# Patient Record
Sex: FEMALE | Race: WHITE | NOT HISPANIC OR LATINO | Employment: OTHER | URBAN - METROPOLITAN AREA
[De-identification: names, ages, dates, MRNs, and addresses within clinical notes are randomized per-mention and may not be internally consistent; named-entity substitution may affect disease eponyms.]

---

## 2018-01-25 ENCOUNTER — APPOINTMENT (OUTPATIENT)
Dept: PREADMISSION TESTING | Facility: HOSPITAL | Age: 63
End: 2018-01-25

## 2018-01-25 VITALS
HEART RATE: 73 BPM | SYSTOLIC BLOOD PRESSURE: 129 MMHG | OXYGEN SATURATION: 100 % | WEIGHT: 266 LBS | RESPIRATION RATE: 20 BRPM | DIASTOLIC BLOOD PRESSURE: 81 MMHG | TEMPERATURE: 97.5 F | HEIGHT: 70 IN | BODY MASS INDEX: 38.08 KG/M2

## 2018-01-25 LAB
ANION GAP SERPL CALCULATED.3IONS-SCNC: 13.7 MMOL/L
BUN BLD-MCNC: 16 MG/DL (ref 8–23)
BUN/CREAT SERPL: 17.6 (ref 7–25)
CALCIUM SPEC-SCNC: 9 MG/DL (ref 8.6–10.5)
CHLORIDE SERPL-SCNC: 97 MMOL/L (ref 98–107)
CO2 SERPL-SCNC: 29.3 MMOL/L (ref 22–29)
CREAT BLD-MCNC: 0.91 MG/DL (ref 0.57–1)
DEPRECATED RDW RBC AUTO: 47.6 FL (ref 37–54)
ERYTHROCYTE [DISTWIDTH] IN BLOOD BY AUTOMATED COUNT: 14 % (ref 11.7–13)
GFR SERPL CREATININE-BSD FRML MDRD: 63 ML/MIN/1.73
GLUCOSE BLD-MCNC: 112 MG/DL (ref 65–99)
HCT VFR BLD AUTO: 41.2 % (ref 35.6–45.5)
HGB BLD-MCNC: 13.6 G/DL (ref 11.9–15.5)
MCH RBC QN AUTO: 30.6 PG (ref 26.9–32)
MCHC RBC AUTO-ENTMCNC: 33 G/DL (ref 32.4–36.3)
MCV RBC AUTO: 92.8 FL (ref 80.5–98.2)
PLATELET # BLD AUTO: 273 10*3/MM3 (ref 140–500)
PMV BLD AUTO: 10.5 FL (ref 6–12)
POTASSIUM BLD-SCNC: 3.6 MMOL/L (ref 3.5–5.2)
RBC # BLD AUTO: 4.44 10*6/MM3 (ref 3.9–5.2)
SODIUM BLD-SCNC: 140 MMOL/L (ref 136–145)
WBC NRBC COR # BLD: 9.46 10*3/MM3 (ref 4.5–10.7)

## 2018-01-25 PROCEDURE — 36415 COLL VENOUS BLD VENIPUNCTURE: CPT

## 2018-01-25 PROCEDURE — 85027 COMPLETE CBC AUTOMATED: CPT | Performed by: OBSTETRICS & GYNECOLOGY

## 2018-01-25 PROCEDURE — 80048 BASIC METABOLIC PNL TOTAL CA: CPT | Performed by: OBSTETRICS & GYNECOLOGY

## 2018-01-25 PROCEDURE — 93005 ELECTROCARDIOGRAM TRACING: CPT

## 2018-01-25 PROCEDURE — 93010 ELECTROCARDIOGRAM REPORT: CPT | Performed by: INTERNAL MEDICINE

## 2018-01-25 RX ORDER — CHOLECALCIFEROL (VITAMIN D3) 25 MCG
1 CAPSULE ORAL DAILY
COMMUNITY
End: 2023-02-01

## 2018-01-25 RX ORDER — METOPROLOL TARTRATE 100 MG/1
100 TABLET ORAL EVERY MORNING
COMMUNITY
End: 2021-11-09

## 2018-01-25 RX ORDER — ALPRAZOLAM 0.5 MG/1
0.5 TABLET ORAL 2 TIMES DAILY PRN
COMMUNITY
End: 2021-11-09 | Stop reason: SDUPTHER

## 2018-01-25 RX ORDER — COLCHICINE 0.6 MG/1
0.6 TABLET ORAL 2 TIMES DAILY PRN
COMMUNITY
End: 2021-11-09

## 2018-01-25 RX ORDER — TRIAMTERENE AND HYDROCHLOROTHIAZIDE 75; 50 MG/1; MG/1
1 TABLET ORAL EVERY MORNING
COMMUNITY
End: 2021-11-09

## 2018-01-25 RX ORDER — CETIRIZINE HYDROCHLORIDE 10 MG/1
10 TABLET ORAL DAILY
COMMUNITY

## 2018-01-25 RX ORDER — INDOMETHACIN 50 MG/1
50 CAPSULE ORAL 2 TIMES DAILY PRN
COMMUNITY
End: 2021-11-09

## 2018-01-25 RX ORDER — PANTOPRAZOLE SODIUM 40 MG/1
40 TABLET, DELAYED RELEASE ORAL EVERY MORNING
COMMUNITY
End: 2022-11-08 | Stop reason: SDUPTHER

## 2018-01-25 RX ORDER — ALLOPURINOL 100 MG/1
100 TABLET ORAL EVERY MORNING
COMMUNITY
End: 2021-11-09

## 2018-01-25 NOTE — DISCHARGE INSTRUCTIONS
Take the following medications the morning of surgery with a small sip of water:    PANTROPAZOLE AND METOPROLOL.    ARRIVE AT 12:00    General Instructions:  • Do not eat solid food after midnight the night before surgery.  • You may drink clear liquids day of surgery but must stop at least one hour before your hospital arrival time.  • It is beneficial for you to have a clear drink that contains carbohydrates the day of surgery.  We suggest a 12 to 20 ounce bottle of Gatorade or Powerade for non-diabetic patients or a 12 to 20 ounce bottle of G2 or Powerade Zero for diabetic patients. (Pediatric patients, are not advised to drink a 12 to 20 ounce carbohydrate drink)    Clear liquids are liquids you can see through.  Nothing red in color.     Plain water                               Sports drinks  Sodas                                   Gelatin (Jell-O)  Fruit juices without pulp such as white grape juice and apple juice  Popsicles that contain no fruit or yogurt  Tea or coffee (no cream or milk added)  Gatorade / Powerade  G2 / Powerade Zero    • Infants may have breast milk up to four hours before surgery.  • Infants drinking formula may drink formula up to six hours before surgery.   • Patients who avoid smoking, chewing tobacco and alcohol for 4 weeks prior to surgery have a reduced risk of post-operative complications.  Quit smoking as many days before surgery as you can.  • Do not smoke, use chewing tobacco or drink alcohol the day of surgery.   • If applicable bring your C-PAP/ BI-PAP machine.  • Bring any papers given to you in the doctor’s office.  • Wear clean comfortable clothes and socks.  • Do not wear contact lenses or make-up.  Bring a case for your glasses.   • Bring crutches or walker if applicable.  • Remove all piercings.  Leave jewelry and any other valuables at home.  • Hair extensions with metal clips must be removed prior to surgery.  • The Pre-Admission Testing nurse will instruct you to  bring medications if unable to obtain an accurate list in Pre-Admission Testing.        If you were given a blood bank ID arm band remember to bring it with you the day of surgery.    Preventing a Surgical Site Infection:  • For 2 to 3 days before surgery, avoid shaving with a razor because the razor can irritate skin and make it easier to develop an infection.  • The night prior to surgery sleep in a clean bed with clean clothing.  Do not allow pets to sleep with you.  • Shower on the morning of surgery using a fresh bar of anti-bacterial soap (such as Dial) and clean washcloth.  Dry with a clean towel and dress in clean clothing.  • Ask your surgeon if you will be receiving antibiotics prior to surgery.  • Make sure you, your family, and all healthcare providers clean their hands with soap and water or an alcohol based hand  before caring for you or your wound.    Day of surgery:  Upon arrival, a Pre-op nurse and Anesthesiologist will review your health history, obtain vital signs, and answer questions you may have.  The only belongings needed at this time will be your home medications and if applicable your C-PAP/BI-PAP machine.  If you are staying overnight your family can leave the rest of your belongings in the car and bring them to your room later.  A Pre-op nurse will start an IV and you may receive medication in preparation for surgery, including something to help you relax.  Your family will be able to see you in the Pre-op area.  While you are in surgery your family should notify the waiting room  if they leave the waiting room area and provide a contact phone number.    Please be aware that surgery does come with discomfort.  We want to make every effort to control your discomfort so please discuss any uncontrolled symptoms with your nurse.   Your doctor will most likely have prescribed pain medications.      If you are going home after surgery you will receive individualized written  care instructions before being discharged.  A responsible adult must drive you to and from the hospital on the day of your surgery and stay with you for 24 hours.    If you are staying overnight following surgery, you will be transported to your hospital room following the recovery period.  [facility] has all private rooms.    If you have any questions please call Pre-Admission Testing at [phone].  Deductibles and co-payments are collected on the day of service. Please be prepared to pay the required co-pay, deductible or deposit on the day of service as defined by your plan.

## 2018-01-29 ENCOUNTER — HOSPITAL ENCOUNTER (OUTPATIENT)
Facility: HOSPITAL | Age: 63
Setting detail: HOSPITAL OUTPATIENT SURGERY
Discharge: HOME | End: 2018-01-29
Attending: OBSTETRICS & GYNECOLOGY | Admitting: OBSTETRICS & GYNECOLOGY

## 2018-01-29 ENCOUNTER — ANESTHESIA (OUTPATIENT)
Dept: PERIOP | Facility: HOSPITAL | Age: 63
End: 2018-01-29

## 2018-01-29 ENCOUNTER — ANESTHESIA EVENT (OUTPATIENT)
Dept: PERIOP | Facility: HOSPITAL | Age: 63
End: 2018-01-29

## 2018-01-29 VITALS
BODY MASS INDEX: 39.29 KG/M2 | HEIGHT: 69 IN | HEART RATE: 62 BPM | RESPIRATION RATE: 16 BRPM | WEIGHT: 265.3 LBS | DIASTOLIC BLOOD PRESSURE: 81 MMHG | TEMPERATURE: 97.7 F | SYSTOLIC BLOOD PRESSURE: 152 MMHG | OXYGEN SATURATION: 98 %

## 2018-01-29 DIAGNOSIS — N93.9 ABNORMAL UTERINE BLEEDING: ICD-10-CM

## 2018-01-29 PROCEDURE — 25010000002 KETOROLAC TROMETHAMINE PER 15 MG: Performed by: NURSE ANESTHETIST, CERTIFIED REGISTERED

## 2018-01-29 PROCEDURE — 88305 TISSUE EXAM BY PATHOLOGIST: CPT | Performed by: OBSTETRICS & GYNECOLOGY

## 2018-01-29 PROCEDURE — 25010000002 MIDAZOLAM PER 1 MG: Performed by: ANESTHESIOLOGY

## 2018-01-29 PROCEDURE — 25010000002 PROPOFOL 10 MG/ML EMULSION: Performed by: NURSE ANESTHETIST, CERTIFIED REGISTERED

## 2018-01-29 PROCEDURE — 25010000002 FENTANYL CITRATE (PF) 100 MCG/2ML SOLUTION: Performed by: ANESTHESIOLOGY

## 2018-01-29 RX ORDER — KETOROLAC TROMETHAMINE 30 MG/ML
INJECTION, SOLUTION INTRAMUSCULAR; INTRAVENOUS AS NEEDED
Status: DISCONTINUED | OUTPATIENT
Start: 2018-01-29 | End: 2018-01-29 | Stop reason: SURG

## 2018-01-29 RX ORDER — LIDOCAINE HYDROCHLORIDE 10 MG/ML
0.5 INJECTION, SOLUTION EPIDURAL; INFILTRATION; INTRACAUDAL; PERINEURAL ONCE AS NEEDED
Status: DISCONTINUED | OUTPATIENT
Start: 2018-01-29 | End: 2018-01-29 | Stop reason: HOSPADM

## 2018-01-29 RX ORDER — PROPOFOL 10 MG/ML
VIAL (ML) INTRAVENOUS AS NEEDED
Status: DISCONTINUED | OUTPATIENT
Start: 2018-01-29 | End: 2018-01-29 | Stop reason: SURG

## 2018-01-29 RX ORDER — SODIUM CHLORIDE, SODIUM LACTATE, POTASSIUM CHLORIDE, CALCIUM CHLORIDE 600; 310; 30; 20 MG/100ML; MG/100ML; MG/100ML; MG/100ML
9 INJECTION, SOLUTION INTRAVENOUS CONTINUOUS
Status: DISCONTINUED | OUTPATIENT
Start: 2018-01-29 | End: 2018-01-29 | Stop reason: HOSPADM

## 2018-01-29 RX ORDER — ALBUTEROL SULFATE 2.5 MG/3ML
2.5 SOLUTION RESPIRATORY (INHALATION) ONCE AS NEEDED
Status: DISCONTINUED | OUTPATIENT
Start: 2018-01-29 | End: 2018-01-29 | Stop reason: HOSPADM

## 2018-01-29 RX ORDER — FENTANYL CITRATE 50 UG/ML
50 INJECTION, SOLUTION INTRAMUSCULAR; INTRAVENOUS
Status: DISCONTINUED | OUTPATIENT
Start: 2018-01-29 | End: 2018-01-29 | Stop reason: HOSPADM

## 2018-01-29 RX ORDER — ONDANSETRON 2 MG/ML
4 INJECTION INTRAMUSCULAR; INTRAVENOUS ONCE AS NEEDED
Status: DISCONTINUED | OUTPATIENT
Start: 2018-01-29 | End: 2018-01-29 | Stop reason: HOSPADM

## 2018-01-29 RX ORDER — LABETALOL HYDROCHLORIDE 5 MG/ML
5 INJECTION, SOLUTION INTRAVENOUS
Status: DISCONTINUED | OUTPATIENT
Start: 2018-01-29 | End: 2018-01-29 | Stop reason: HOSPADM

## 2018-01-29 RX ORDER — SCOLOPAMINE TRANSDERMAL SYSTEM 1 MG/1
1 PATCH, EXTENDED RELEASE TRANSDERMAL ONCE
Status: DISCONTINUED | OUTPATIENT
Start: 2018-01-29 | End: 2018-01-29 | Stop reason: HOSPADM

## 2018-01-29 RX ORDER — MIDAZOLAM HYDROCHLORIDE 1 MG/ML
1 INJECTION INTRAMUSCULAR; INTRAVENOUS
Status: DISCONTINUED | OUTPATIENT
Start: 2018-01-29 | End: 2018-01-29 | Stop reason: HOSPADM

## 2018-01-29 RX ORDER — LIDOCAINE HYDROCHLORIDE 20 MG/ML
INJECTION, SOLUTION INFILTRATION; PERINEURAL AS NEEDED
Status: DISCONTINUED | OUTPATIENT
Start: 2018-01-29 | End: 2018-01-29 | Stop reason: SURG

## 2018-01-29 RX ORDER — MIDAZOLAM HYDROCHLORIDE 1 MG/ML
2 INJECTION INTRAMUSCULAR; INTRAVENOUS
Status: DISCONTINUED | OUTPATIENT
Start: 2018-01-29 | End: 2018-01-29 | Stop reason: HOSPADM

## 2018-01-29 RX ORDER — FAMOTIDINE 10 MG/ML
20 INJECTION, SOLUTION INTRAVENOUS ONCE
Status: COMPLETED | OUTPATIENT
Start: 2018-01-29 | End: 2018-01-29

## 2018-01-29 RX ORDER — PROPOFOL 10 MG/ML
VIAL (ML) INTRAVENOUS CONTINUOUS PRN
Status: DISCONTINUED | OUTPATIENT
Start: 2018-01-29 | End: 2018-01-29 | Stop reason: SURG

## 2018-01-29 RX ORDER — LIDOCAINE HYDROCHLORIDE 10 MG/ML
INJECTION, SOLUTION EPIDURAL; INFILTRATION; INTRACAUDAL; PERINEURAL AS NEEDED
Status: DISCONTINUED | OUTPATIENT
Start: 2018-01-29 | End: 2018-01-29 | Stop reason: HOSPADM

## 2018-01-29 RX ORDER — FENTANYL CITRATE 50 UG/ML
25 INJECTION, SOLUTION INTRAMUSCULAR; INTRAVENOUS
Status: DISCONTINUED | OUTPATIENT
Start: 2018-01-29 | End: 2018-01-29 | Stop reason: HOSPADM

## 2018-01-29 RX ORDER — HYDRALAZINE HYDROCHLORIDE 20 MG/ML
5 INJECTION INTRAMUSCULAR; INTRAVENOUS
Status: DISCONTINUED | OUTPATIENT
Start: 2018-01-29 | End: 2018-01-29 | Stop reason: HOSPADM

## 2018-01-29 RX ORDER — SODIUM CHLORIDE 0.9 % (FLUSH) 0.9 %
1-10 SYRINGE (ML) INJECTION AS NEEDED
Status: DISCONTINUED | OUTPATIENT
Start: 2018-01-29 | End: 2018-01-29 | Stop reason: HOSPADM

## 2018-01-29 RX ADMIN — Medication 2 MG: at 14:13

## 2018-01-29 RX ADMIN — KETOROLAC TROMETHAMINE 30 MG: 30 INJECTION, SOLUTION INTRAMUSCULAR; INTRAVENOUS at 14:31

## 2018-01-29 RX ADMIN — FAMOTIDINE 20 MG: 10 INJECTION, SOLUTION INTRAVENOUS at 13:29

## 2018-01-29 RX ADMIN — SODIUM CHLORIDE, POTASSIUM CHLORIDE, SODIUM LACTATE AND CALCIUM CHLORIDE: 600; 310; 30; 20 INJECTION, SOLUTION INTRAVENOUS at 14:11

## 2018-01-29 RX ADMIN — LIDOCAINE HYDROCHLORIDE 40 MG: 20 INJECTION, SOLUTION INFILTRATION; PERINEURAL at 14:21

## 2018-01-29 RX ADMIN — FENTANYL CITRATE 50 MCG: 50 INJECTION INTRAMUSCULAR; INTRAVENOUS at 14:16

## 2018-01-29 RX ADMIN — PROPOFOL 50 MG: 10 INJECTION, EMULSION INTRAVENOUS at 14:21

## 2018-01-29 RX ADMIN — SCOPALAMINE 1 PATCH: 1 PATCH, EXTENDED RELEASE TRANSDERMAL at 13:27

## 2018-01-29 RX ADMIN — FENTANYL CITRATE 50 MCG: 50 INJECTION INTRAMUSCULAR; INTRAVENOUS at 14:31

## 2018-01-29 RX ADMIN — MIDAZOLAM 2 MG: 1 INJECTION INTRAMUSCULAR; INTRAVENOUS at 13:29

## 2018-01-29 RX ADMIN — PROPOFOL 200 MCG/KG/MIN: 10 INJECTION, EMULSION INTRAVENOUS at 14:21

## 2018-01-29 NOTE — ANESTHESIA PREPROCEDURE EVALUATION
Anesthesia Evaluation     Patient summary reviewed and Nursing notes reviewed   history of anesthetic complications: PONV  NPO Solid Status: > 8 hours  NPO Liquid Status: > 2 hours     Airway   Mallampati: II  TM distance: >3 FB  Neck ROM: full  no difficulty expected  Dental - normal exam     Pulmonary - normal exam   Sleep apnea: thick neck.  Cardiovascular     ECG reviewed  Patient on routine beta blocker and Beta blocker given within 24 hours of surgery  Rhythm: regular  Rate: normal    (+) hypertension, dysrhythmias (QTc prolonged),   (-) angina      Neuro/Psych  (+) psychiatric history Anxiety,     GI/Hepatic/Renal/Endo    (+) obesity,  GERD (esophageal spasm, LD of PPI yesterday) well controlled,     Musculoskeletal     (+) back pain (2/2 cytotec),   Abdominal  - normal exam   Substance History      OB/GYN          Other      history of cancer (breast cancer)                                          Anesthesia Plan    ASA 3     MAC   (Patient would prefer MAC vs general anesthesia. Risks and benefits of both explained to patient.)  intravenous induction   Anesthetic plan and risks discussed with patient.    Plan discussed with CRNA.

## 2018-01-29 NOTE — ANESTHESIA POSTPROCEDURE EVALUATION
"Patient: Ifeoma Stout    Procedure Summary     Date Anesthesia Start Anesthesia Stop Room / Location    01/29/18 1413 1446  [facility] OR 15 / [facility] MAIN OR       Procedure Diagnosis Surgeon Provider    DILATATION AND CURETTAGE (N/A Vagina) No diagnosis on file. MD Luis Manuel Wilcox MD          Anesthesia Type: MAC  Last vitals  BP   138/74 (01/29/18 1500)   Temp   36.6 °C (97.8 °F) (01/29/18 1444)   Pulse   61 (01/29/18 1500)   Resp   12 (01/29/18 1500)     SpO2   97 % (01/29/18 1500)     Post Anesthesia Care and Evaluation    Patient location during evaluation: bedside  Patient participation: complete - patient participated  Level of consciousness: awake  Pain management: adequate  Airway patency: patent  Anesthetic complications: No anesthetic complications    Cardiovascular status: acceptable  Respiratory status: acceptable  Hydration status: acceptable    Comments: /74  Pulse 61  Temp 36.6 °C (97.8 °F)  Resp 12  Ht 175.3 cm (69\")  Wt 120 kg (265 lb 4.8 oz)  SpO2 97%  BMI 39.18 kg/m2        "

## 2018-01-30 LAB
CYTO UR: NORMAL
LAB AP CASE REPORT: NORMAL
Lab: NORMAL
PATH REPORT.FINAL DX SPEC: NORMAL
PATH REPORT.GROSS SPEC: NORMAL

## 2018-03-07 ENCOUNTER — APPOINTMENT (OUTPATIENT)
Dept: PREADMISSION TESTING | Facility: HOSPITAL | Age: 63
End: 2018-03-07

## 2018-03-07 VITALS
SYSTOLIC BLOOD PRESSURE: 118 MMHG | BODY MASS INDEX: 38.95 KG/M2 | OXYGEN SATURATION: 100 % | HEIGHT: 69 IN | TEMPERATURE: 97.5 F | WEIGHT: 263 LBS | RESPIRATION RATE: 20 BRPM | HEART RATE: 69 BPM | DIASTOLIC BLOOD PRESSURE: 73 MMHG

## 2018-03-07 LAB
ABO GROUP BLD: NORMAL
ANION GAP SERPL CALCULATED.3IONS-SCNC: 11.3 MMOL/L
BASOPHILS # BLD AUTO: 0.02 10*3/MM3 (ref 0–0.2)
BASOPHILS NFR BLD AUTO: 0.2 % (ref 0–1.5)
BLD GP AB SCN SERPL QL: NEGATIVE
BUN BLD-MCNC: 14 MG/DL (ref 8–23)
BUN/CREAT SERPL: 15.1 (ref 7–25)
CALCIUM SPEC-SCNC: 9.3 MG/DL (ref 8.6–10.5)
CHLORIDE SERPL-SCNC: 98 MMOL/L (ref 98–107)
CO2 SERPL-SCNC: 30.7 MMOL/L (ref 22–29)
CREAT BLD-MCNC: 0.93 MG/DL (ref 0.57–1)
DEPRECATED RDW RBC AUTO: 47.6 FL (ref 37–54)
EOSINOPHIL # BLD AUTO: 0.14 10*3/MM3 (ref 0–0.7)
EOSINOPHIL NFR BLD AUTO: 1.6 % (ref 0.3–6.2)
ERYTHROCYTE [DISTWIDTH] IN BLOOD BY AUTOMATED COUNT: 14 % (ref 11.7–13)
GFR SERPL CREATININE-BSD FRML MDRD: 61 ML/MIN/1.73
GLUCOSE BLD-MCNC: 125 MG/DL (ref 65–99)
HCT VFR BLD AUTO: 42.4 % (ref 35.6–45.5)
HGB BLD-MCNC: 13.9 G/DL (ref 11.9–15.5)
IMM GRANULOCYTES # BLD: 0 10*3/MM3 (ref 0–0.03)
IMM GRANULOCYTES NFR BLD: 0 % (ref 0–0.5)
LYMPHOCYTES # BLD AUTO: 2.64 10*3/MM3 (ref 0.9–4.8)
LYMPHOCYTES NFR BLD AUTO: 29.4 % (ref 19.6–45.3)
MCH RBC QN AUTO: 30.7 PG (ref 26.9–32)
MCHC RBC AUTO-ENTMCNC: 32.8 G/DL (ref 32.4–36.3)
MCV RBC AUTO: 93.6 FL (ref 80.5–98.2)
MONOCYTES # BLD AUTO: 0.58 10*3/MM3 (ref 0.2–1.2)
MONOCYTES NFR BLD AUTO: 6.5 % (ref 5–12)
NEUTROPHILS # BLD AUTO: 5.6 10*3/MM3 (ref 1.9–8.1)
NEUTROPHILS NFR BLD AUTO: 62.3 % (ref 42.7–76)
PLATELET # BLD AUTO: 258 10*3/MM3 (ref 140–500)
PMV BLD AUTO: 10.1 FL (ref 6–12)
POTASSIUM BLD-SCNC: 3.8 MMOL/L (ref 3.5–5.2)
RBC # BLD AUTO: 4.53 10*6/MM3 (ref 3.9–5.2)
RH BLD: POSITIVE
SODIUM BLD-SCNC: 140 MMOL/L (ref 136–145)
WBC NRBC COR # BLD: 8.98 10*3/MM3 (ref 4.5–10.7)

## 2018-03-07 PROCEDURE — 86901 BLOOD TYPING SEROLOGIC RH(D): CPT | Performed by: OBSTETRICS & GYNECOLOGY

## 2018-03-07 PROCEDURE — 85025 COMPLETE CBC W/AUTO DIFF WBC: CPT | Performed by: OBSTETRICS & GYNECOLOGY

## 2018-03-07 PROCEDURE — 86850 RBC ANTIBODY SCREEN: CPT | Performed by: OBSTETRICS & GYNECOLOGY

## 2018-03-07 PROCEDURE — 86900 BLOOD TYPING SEROLOGIC ABO: CPT | Performed by: OBSTETRICS & GYNECOLOGY

## 2018-03-07 PROCEDURE — 80048 BASIC METABOLIC PNL TOTAL CA: CPT | Performed by: OBSTETRICS & GYNECOLOGY

## 2018-03-07 PROCEDURE — 36415 COLL VENOUS BLD VENIPUNCTURE: CPT

## 2018-03-07 RX ORDER — ASPIRIN 81 MG/1
81 TABLET, CHEWABLE ORAL DAILY
COMMUNITY

## 2018-03-07 NOTE — DISCHARGE INSTRUCTIONS
Take the following medications the morning of surgery with a small sip of water:  METOPROLOL, PANTROPRAZOLE    ARRIVE TO MAIN OR AT 5:30 A.M.      General Instructions:  • Do not eat solid food after midnight the night before surgery.  • You may drink clear liquids day of surgery but must stop at least one hour before your hospital arrival time.  • It is beneficial for you to have a clear drink that contains carbohydrates the day of surgery.  We suggest a 12 to 20 ounce bottle of Gatorade or Powerade for non-diabetic patients or a 12 to 20 ounce bottle of G2 or Powerade Zero for diabetic patients. (Pediatric patients, are not advised to drink a 12 to 20 ounce carbohydrate drink)    Clear liquids are liquids you can see through.  Nothing red in color.     Plain water                               Sports drinks  Sodas                                   Gelatin (Jell-O)  Fruit juices without pulp such as white grape juice and apple juice  Popsicles that contain no fruit or yogurt  Tea or coffee (no cream or milk added)  Gatorade / Powerade  G2 / Powerade Zero    • Infants may have breast milk up to four hours before surgery.  • Infants drinking formula may drink formula up to six hours before surgery.   • Patients who avoid smoking, chewing tobacco and alcohol for 4 weeks prior to surgery have a reduced risk of post-operative complications.  Quit smoking as many days before surgery as you can.  • Do not smoke, use chewing tobacco or drink alcohol the day of surgery.   • If applicable bring your C-PAP/ BI-PAP machine.  • Bring any papers given to you in the doctor’s office.  • Wear clean comfortable clothes and socks.  • Do not wear contact lenses or make-up.  Bring a case for your glasses.   • Bring crutches or walker if applicable.  • Remove all piercings.  Leave jewelry and any other valuables at home.  • Hair extensions with metal clips must be removed prior to surgery.  • The Pre-Admission Testing nurse will  instruct you to bring medications if unable to obtain an accurate list in Pre-Admission Testing.        If you were given a blood bank ID arm band remember to bring it with you the day of surgery.    Preventing a Surgical Site Infection:  • For 2 to 3 days before surgery, avoid shaving with a razor because the razor can irritate skin and make it easier to develop an infection.  • The night prior to surgery sleep in a clean bed with clean clothing.  Do not allow pets to sleep with you.  • Shower on the morning of surgery using a fresh bar of anti-bacterial soap (such as Dial) and clean washcloth.  Dry with a clean towel and dress in clean clothing.  • Ask your surgeon if you will be receiving antibiotics prior to surgery.  • Make sure you, your family, and all healthcare providers clean their hands with soap and water or an alcohol based hand  before caring for you or your wound.    Day of surgery:  Upon arrival, a Pre-op nurse and Anesthesiologist will review your health history, obtain vital signs, and answer questions you may have.  The only belongings needed at this time will be your home medications and if applicable your C-PAP/BI-PAP machine.  If you are staying overnight your family can leave the rest of your belongings in the car and bring them to your room later.  A Pre-op nurse will start an IV and you may receive medication in preparation for surgery, including something to help you relax.  Your family will be able to see you in the Pre-op area.  While you are in surgery your family should notify the waiting room  if they leave the waiting room area and provide a contact phone number.    Please be aware that surgery does come with discomfort.  We want to make every effort to control your discomfort so please discuss any uncontrolled symptoms with your nurse.   Your doctor will most likely have prescribed pain medications.      If you are going home after surgery you will receive  individualized written care instructions before being discharged.  A responsible adult must drive you to and from the hospital on the day of your surgery and stay with you for 24 hours.    If you are staying overnight following surgery, you will be transported to your hospital room following the recovery period.  [facility] has all private rooms.    If you have any questions please call Pre-Admission Testing at [phone].  Deductibles and co-payments are collected on the day of service. Please be prepared to pay the required co-pay, deductible or deposit on the day of service as defined by your plan.

## 2018-03-12 ENCOUNTER — ANESTHESIA (OUTPATIENT)
Dept: PERIOP | Facility: HOSPITAL | Age: 63
End: 2018-03-12

## 2018-03-12 ENCOUNTER — ANESTHESIA EVENT (OUTPATIENT)
Dept: PERIOP | Facility: HOSPITAL | Age: 63
End: 2018-03-12

## 2018-03-12 ENCOUNTER — HOSPITAL ENCOUNTER (OUTPATIENT)
Facility: HOSPITAL | Age: 63
Discharge: HOME | End: 2018-03-13
Attending: OBSTETRICS & GYNECOLOGY | Admitting: OBSTETRICS & GYNECOLOGY

## 2018-03-12 DIAGNOSIS — D25.9 UTERINE FIBROID: ICD-10-CM

## 2018-03-12 PROCEDURE — 25010000002 DEXAMETHASONE PER 1 MG: Performed by: NURSE ANESTHETIST, CERTIFIED REGISTERED

## 2018-03-12 PROCEDURE — 25010000002 PROPOFOL 10 MG/ML EMULSION: Performed by: NURSE ANESTHETIST, CERTIFIED REGISTERED

## 2018-03-12 PROCEDURE — 88309 TISSUE EXAM BY PATHOLOGIST: CPT | Performed by: OBSTETRICS & GYNECOLOGY

## 2018-03-12 PROCEDURE — 25010000002 ONDANSETRON PER 1 MG: Performed by: NURSE ANESTHETIST, CERTIFIED REGISTERED

## 2018-03-12 PROCEDURE — 25010000002 ONDANSETRON PER 1 MG: Performed by: OBSTETRICS & GYNECOLOGY

## 2018-03-12 PROCEDURE — 94799 UNLISTED PULMONARY SVC/PX: CPT

## 2018-03-12 PROCEDURE — 25010000002 FENTANYL CITRATE (PF) 100 MCG/2ML SOLUTION: Performed by: NURSE ANESTHETIST, CERTIFIED REGISTERED

## 2018-03-12 PROCEDURE — 25010000002 HYDROMORPHONE PER 4 MG: Performed by: NURSE ANESTHETIST, CERTIFIED REGISTERED

## 2018-03-12 PROCEDURE — 25010000003 CEFAZOLIN IN DEXTROSE 2-4 GM/100ML-% SOLUTION: Performed by: OBSTETRICS & GYNECOLOGY

## 2018-03-12 PROCEDURE — 25010000002 ROPIVACAINE PER 1 MG: Performed by: OBSTETRICS & GYNECOLOGY

## 2018-03-12 PROCEDURE — 25010000002 MIDAZOLAM PER 1 MG: Performed by: ANESTHESIOLOGY

## 2018-03-12 RX ORDER — FENTANYL CITRATE 50 UG/ML
INJECTION, SOLUTION INTRAMUSCULAR; INTRAVENOUS AS NEEDED
Status: DISCONTINUED | OUTPATIENT
Start: 2018-03-12 | End: 2018-03-12 | Stop reason: SURG

## 2018-03-12 RX ORDER — ALLOPURINOL 100 MG/1
100 TABLET ORAL EVERY MORNING
Status: DISCONTINUED | OUTPATIENT
Start: 2018-03-13 | End: 2018-03-13 | Stop reason: HOSPADM

## 2018-03-12 RX ORDER — INDOMETHACIN 50 MG/1
50 CAPSULE ORAL 2 TIMES DAILY PRN
Status: DISCONTINUED | OUTPATIENT
Start: 2018-03-12 | End: 2018-03-13 | Stop reason: HOSPADM

## 2018-03-12 RX ORDER — MIDAZOLAM HYDROCHLORIDE 1 MG/ML
2 INJECTION INTRAMUSCULAR; INTRAVENOUS
Status: DISCONTINUED | OUTPATIENT
Start: 2018-03-12 | End: 2018-03-12 | Stop reason: HOSPADM

## 2018-03-12 RX ORDER — MIDAZOLAM HYDROCHLORIDE 1 MG/ML
1 INJECTION INTRAMUSCULAR; INTRAVENOUS
Status: DISCONTINUED | OUTPATIENT
Start: 2018-03-12 | End: 2018-03-12 | Stop reason: HOSPADM

## 2018-03-12 RX ORDER — FLUMAZENIL 0.1 MG/ML
0.2 INJECTION INTRAVENOUS AS NEEDED
Status: DISCONTINUED | OUTPATIENT
Start: 2018-03-12 | End: 2018-03-12 | Stop reason: HOSPADM

## 2018-03-12 RX ORDER — ROCURONIUM BROMIDE 10 MG/ML
INJECTION, SOLUTION INTRAVENOUS AS NEEDED
Status: DISCONTINUED | OUTPATIENT
Start: 2018-03-12 | End: 2018-03-12 | Stop reason: SURG

## 2018-03-12 RX ORDER — ONDANSETRON 2 MG/ML
4 INJECTION INTRAMUSCULAR; INTRAVENOUS ONCE AS NEEDED
Status: DISCONTINUED | OUTPATIENT
Start: 2018-03-12 | End: 2018-03-12 | Stop reason: HOSPADM

## 2018-03-12 RX ORDER — OXYCODONE AND ACETAMINOPHEN 7.5; 325 MG/1; MG/1
1 TABLET ORAL ONCE AS NEEDED
Status: DISCONTINUED | OUTPATIENT
Start: 2018-03-12 | End: 2018-03-12 | Stop reason: HOSPADM

## 2018-03-12 RX ORDER — SODIUM CHLORIDE 0.9 % (FLUSH) 0.9 %
1-10 SYRINGE (ML) INJECTION AS NEEDED
Status: DISCONTINUED | OUTPATIENT
Start: 2018-03-12 | End: 2018-03-12 | Stop reason: HOSPADM

## 2018-03-12 RX ORDER — DEXAMETHASONE SODIUM PHOSPHATE 10 MG/ML
INJECTION INTRAMUSCULAR; INTRAVENOUS AS NEEDED
Status: DISCONTINUED | OUTPATIENT
Start: 2018-03-12 | End: 2018-03-12 | Stop reason: SURG

## 2018-03-12 RX ORDER — SODIUM CHLORIDE, SODIUM LACTATE, POTASSIUM CHLORIDE, CALCIUM CHLORIDE 600; 310; 30; 20 MG/100ML; MG/100ML; MG/100ML; MG/100ML
INJECTION, SOLUTION INTRAVENOUS CONTINUOUS PRN
Status: DISCONTINUED | OUTPATIENT
Start: 2018-03-12 | End: 2018-03-12 | Stop reason: SURG

## 2018-03-12 RX ORDER — COLCHICINE 0.6 MG/1
0.6 TABLET ORAL 2 TIMES DAILY PRN
Status: DISCONTINUED | OUTPATIENT
Start: 2018-03-12 | End: 2018-03-13 | Stop reason: HOSPADM

## 2018-03-12 RX ORDER — ONDANSETRON 2 MG/ML
4 INJECTION INTRAMUSCULAR; INTRAVENOUS EVERY 6 HOURS PRN
Status: DISCONTINUED | OUTPATIENT
Start: 2018-03-12 | End: 2018-03-13 | Stop reason: HOSPADM

## 2018-03-12 RX ORDER — CETIRIZINE HYDROCHLORIDE 10 MG/1
10 TABLET ORAL DAILY
Status: DISCONTINUED | OUTPATIENT
Start: 2018-03-12 | End: 2018-03-13 | Stop reason: HOSPADM

## 2018-03-12 RX ORDER — SIMETHICONE 80 MG
80 TABLET,CHEWABLE ORAL 4 TIMES DAILY PRN
Status: DISCONTINUED | OUTPATIENT
Start: 2018-03-12 | End: 2018-03-13 | Stop reason: HOSPADM

## 2018-03-12 RX ORDER — ALPRAZOLAM 0.5 MG/1
0.5 TABLET ORAL 2 TIMES DAILY PRN
Status: DISCONTINUED | OUTPATIENT
Start: 2018-03-12 | End: 2018-03-13 | Stop reason: HOSPADM

## 2018-03-12 RX ORDER — PROMETHAZINE HYDROCHLORIDE 25 MG/1
12.5 TABLET ORAL ONCE AS NEEDED
Status: DISCONTINUED | OUTPATIENT
Start: 2018-03-12 | End: 2018-03-12 | Stop reason: HOSPADM

## 2018-03-12 RX ORDER — FAMOTIDINE 10 MG/ML
20 INJECTION, SOLUTION INTRAVENOUS ONCE
Status: COMPLETED | OUTPATIENT
Start: 2018-03-12 | End: 2018-03-12

## 2018-03-12 RX ORDER — LIDOCAINE HYDROCHLORIDE 20 MG/ML
INJECTION, SOLUTION INFILTRATION; PERINEURAL AS NEEDED
Status: DISCONTINUED | OUTPATIENT
Start: 2018-03-12 | End: 2018-03-12 | Stop reason: SURG

## 2018-03-12 RX ORDER — SODIUM CHLORIDE, SODIUM LACTATE, POTASSIUM CHLORIDE, CALCIUM CHLORIDE 600; 310; 30; 20 MG/100ML; MG/100ML; MG/100ML; MG/100ML
100 INJECTION, SOLUTION INTRAVENOUS CONTINUOUS
Status: DISCONTINUED | OUTPATIENT
Start: 2018-03-12 | End: 2018-03-13 | Stop reason: HOSPADM

## 2018-03-12 RX ORDER — TRIAMTERENE AND HYDROCHLOROTHIAZIDE 75; 50 MG/1; MG/1
1 TABLET ORAL EVERY MORNING
Status: DISCONTINUED | OUTPATIENT
Start: 2018-03-13 | End: 2018-03-13 | Stop reason: HOSPADM

## 2018-03-12 RX ORDER — ONDANSETRON 4 MG/1
4 TABLET, ORALLY DISINTEGRATING ORAL EVERY 6 HOURS PRN
Status: DISCONTINUED | OUTPATIENT
Start: 2018-03-12 | End: 2018-03-13 | Stop reason: HOSPADM

## 2018-03-12 RX ORDER — IBUPROFEN 400 MG/1
400 TABLET ORAL EVERY 6 HOURS PRN
Status: DISCONTINUED | OUTPATIENT
Start: 2018-03-12 | End: 2018-03-13 | Stop reason: HOSPADM

## 2018-03-12 RX ORDER — PROMETHAZINE HYDROCHLORIDE 25 MG/1
25 TABLET ORAL ONCE AS NEEDED
Status: DISCONTINUED | OUTPATIENT
Start: 2018-03-12 | End: 2018-03-12 | Stop reason: HOSPADM

## 2018-03-12 RX ORDER — OXYCODONE AND ACETAMINOPHEN 7.5; 325 MG/1; MG/1
1 TABLET ORAL EVERY 4 HOURS PRN
Status: DISCONTINUED | OUTPATIENT
Start: 2018-03-12 | End: 2018-03-13 | Stop reason: HOSPADM

## 2018-03-12 RX ORDER — HYDROMORPHONE HCL 110MG/55ML
0.5 PATIENT CONTROLLED ANALGESIA SYRINGE INTRAVENOUS
Status: DISCONTINUED | OUTPATIENT
Start: 2018-03-12 | End: 2018-03-12 | Stop reason: HOSPADM

## 2018-03-12 RX ORDER — LABETALOL HYDROCHLORIDE 5 MG/ML
5 INJECTION, SOLUTION INTRAVENOUS
Status: DISCONTINUED | OUTPATIENT
Start: 2018-03-12 | End: 2018-03-12 | Stop reason: HOSPADM

## 2018-03-12 RX ORDER — EPHEDRINE SULFATE 50 MG/ML
5 INJECTION, SOLUTION INTRAVENOUS ONCE AS NEEDED
Status: DISCONTINUED | OUTPATIENT
Start: 2018-03-12 | End: 2018-03-12 | Stop reason: HOSPADM

## 2018-03-12 RX ORDER — FENTANYL CITRATE 50 UG/ML
50 INJECTION, SOLUTION INTRAMUSCULAR; INTRAVENOUS
Status: DISCONTINUED | OUTPATIENT
Start: 2018-03-12 | End: 2018-03-12 | Stop reason: HOSPADM

## 2018-03-12 RX ORDER — PANTOPRAZOLE SODIUM 40 MG/1
40 TABLET, DELAYED RELEASE ORAL EVERY MORNING
Status: DISCONTINUED | OUTPATIENT
Start: 2018-03-13 | End: 2018-03-13 | Stop reason: HOSPADM

## 2018-03-12 RX ORDER — NALOXONE HCL 0.4 MG/ML
0.2 VIAL (ML) INJECTION AS NEEDED
Status: DISCONTINUED | OUTPATIENT
Start: 2018-03-12 | End: 2018-03-12 | Stop reason: HOSPADM

## 2018-03-12 RX ORDER — ONDANSETRON 2 MG/ML
INJECTION INTRAMUSCULAR; INTRAVENOUS AS NEEDED
Status: DISCONTINUED | OUTPATIENT
Start: 2018-03-12 | End: 2018-03-12 | Stop reason: SURG

## 2018-03-12 RX ORDER — CEFAZOLIN SODIUM 2 G/100ML
2 INJECTION, SOLUTION INTRAVENOUS ONCE
Status: COMPLETED | OUTPATIENT
Start: 2018-03-12 | End: 2018-03-12

## 2018-03-12 RX ORDER — NALOXONE HCL 0.4 MG/ML
0.1 VIAL (ML) INJECTION
Status: DISCONTINUED | OUTPATIENT
Start: 2018-03-12 | End: 2018-03-13 | Stop reason: HOSPADM

## 2018-03-12 RX ORDER — SODIUM CHLORIDE, SODIUM LACTATE, POTASSIUM CHLORIDE, CALCIUM CHLORIDE 600; 310; 30; 20 MG/100ML; MG/100ML; MG/100ML; MG/100ML
9 INJECTION, SOLUTION INTRAVENOUS CONTINUOUS
Status: DISCONTINUED | OUTPATIENT
Start: 2018-03-12 | End: 2018-03-12 | Stop reason: HOSPADM

## 2018-03-12 RX ORDER — ZOLPIDEM TARTRATE 5 MG/1
5 TABLET ORAL NIGHTLY PRN
Status: DISCONTINUED | OUTPATIENT
Start: 2018-03-12 | End: 2018-03-13 | Stop reason: HOSPADM

## 2018-03-12 RX ORDER — ONDANSETRON 4 MG/1
4 TABLET, FILM COATED ORAL EVERY 6 HOURS PRN
Status: DISCONTINUED | OUTPATIENT
Start: 2018-03-12 | End: 2018-03-13 | Stop reason: HOSPADM

## 2018-03-12 RX ORDER — DIPHENHYDRAMINE HYDROCHLORIDE 50 MG/ML
12.5 INJECTION INTRAMUSCULAR; INTRAVENOUS
Status: DISCONTINUED | OUTPATIENT
Start: 2018-03-12 | End: 2018-03-12 | Stop reason: HOSPADM

## 2018-03-12 RX ORDER — HYDRALAZINE HYDROCHLORIDE 20 MG/ML
5 INJECTION INTRAMUSCULAR; INTRAVENOUS
Status: DISCONTINUED | OUTPATIENT
Start: 2018-03-12 | End: 2018-03-12 | Stop reason: HOSPADM

## 2018-03-12 RX ORDER — METOPROLOL TARTRATE 100 MG/1
100 TABLET ORAL EVERY MORNING
Status: DISCONTINUED | OUTPATIENT
Start: 2018-03-13 | End: 2018-03-13 | Stop reason: HOSPADM

## 2018-03-12 RX ORDER — PROPOFOL 10 MG/ML
VIAL (ML) INTRAVENOUS AS NEEDED
Status: DISCONTINUED | OUTPATIENT
Start: 2018-03-12 | End: 2018-03-12 | Stop reason: SURG

## 2018-03-12 RX ORDER — PROMETHAZINE HYDROCHLORIDE 25 MG/ML
12.5 INJECTION, SOLUTION INTRAMUSCULAR; INTRAVENOUS ONCE AS NEEDED
Status: DISCONTINUED | OUTPATIENT
Start: 2018-03-12 | End: 2018-03-12 | Stop reason: HOSPADM

## 2018-03-12 RX ORDER — HYDROMORPHONE HCL 110MG/55ML
PATIENT CONTROLLED ANALGESIA SYRINGE INTRAVENOUS AS NEEDED
Status: DISCONTINUED | OUTPATIENT
Start: 2018-03-12 | End: 2018-03-12 | Stop reason: SURG

## 2018-03-12 RX ORDER — MAGNESIUM HYDROXIDE 1200 MG/15ML
LIQUID ORAL AS NEEDED
Status: DISCONTINUED | OUTPATIENT
Start: 2018-03-12 | End: 2018-03-12 | Stop reason: HOSPADM

## 2018-03-12 RX ORDER — PROMETHAZINE HYDROCHLORIDE 25 MG/1
25 SUPPOSITORY RECTAL ONCE AS NEEDED
Status: DISCONTINUED | OUTPATIENT
Start: 2018-03-12 | End: 2018-03-12 | Stop reason: HOSPADM

## 2018-03-12 RX ORDER — LIDOCAINE HYDROCHLORIDE 10 MG/ML
0.5 INJECTION, SOLUTION EPIDURAL; INFILTRATION; INTRACAUDAL; PERINEURAL ONCE AS NEEDED
Status: DISCONTINUED | OUTPATIENT
Start: 2018-03-12 | End: 2018-03-12 | Stop reason: HOSPADM

## 2018-03-12 RX ORDER — HYDROCODONE BITARTRATE AND ACETAMINOPHEN 7.5; 325 MG/1; MG/1
1 TABLET ORAL ONCE AS NEEDED
Status: DISCONTINUED | OUTPATIENT
Start: 2018-03-12 | End: 2018-03-12 | Stop reason: HOSPADM

## 2018-03-12 RX ADMIN — LIDOCAINE HYDROCHLORIDE 100 MG: 20 INJECTION, SOLUTION INFILTRATION; PERINEURAL at 07:54

## 2018-03-12 RX ADMIN — HYDROMORPHONE HYDROCHLORIDE 0.25 MG: 2 INJECTION INTRAMUSCULAR; INTRAVENOUS; SUBCUTANEOUS at 08:11

## 2018-03-12 RX ADMIN — CEFAZOLIN SODIUM 2 G: 2 INJECTION, SOLUTION INTRAVENOUS at 07:59

## 2018-03-12 RX ADMIN — PROPOFOL 200 MG: 10 INJECTION, EMULSION INTRAVENOUS at 07:54

## 2018-03-12 RX ADMIN — MIDAZOLAM 2 MG: 1 INJECTION INTRAMUSCULAR; INTRAVENOUS at 07:36

## 2018-03-12 RX ADMIN — SODIUM CHLORIDE, POTASSIUM CHLORIDE, SODIUM LACTATE AND CALCIUM CHLORIDE 100 ML/HR: 600; 310; 30; 20 INJECTION, SOLUTION INTRAVENOUS at 13:40

## 2018-03-12 RX ADMIN — FAMOTIDINE 20 MG: 10 INJECTION INTRAVENOUS at 07:36

## 2018-03-12 RX ADMIN — SODIUM CHLORIDE, POTASSIUM CHLORIDE, SODIUM LACTATE AND CALCIUM CHLORIDE 100 ML/HR: 600; 310; 30; 20 INJECTION, SOLUTION INTRAVENOUS at 22:07

## 2018-03-12 RX ADMIN — ROCURONIUM BROMIDE 10 MG: 10 INJECTION INTRAVENOUS at 09:58

## 2018-03-12 RX ADMIN — ROCURONIUM BROMIDE 10 MG: 10 INJECTION INTRAVENOUS at 09:18

## 2018-03-12 RX ADMIN — ROCURONIUM BROMIDE 10 MG: 10 INJECTION INTRAVENOUS at 08:36

## 2018-03-12 RX ADMIN — SODIUM CHLORIDE, POTASSIUM CHLORIDE, SODIUM LACTATE AND CALCIUM CHLORIDE 9 ML/HR: 600; 310; 30; 20 INJECTION, SOLUTION INTRAVENOUS at 07:36

## 2018-03-12 RX ADMIN — FENTANYL CITRATE 50 MCG: 50 INJECTION INTRAMUSCULAR; INTRAVENOUS at 09:29

## 2018-03-12 RX ADMIN — HYDROMORPHONE HYDROCHLORIDE 1 MG: 10 INJECTION INTRAMUSCULAR; INTRAVENOUS; SUBCUTANEOUS at 13:47

## 2018-03-12 RX ADMIN — HYDROMORPHONE HYDROCHLORIDE 0.25 MG: 2 INJECTION INTRAMUSCULAR; INTRAVENOUS; SUBCUTANEOUS at 08:19

## 2018-03-12 RX ADMIN — ROCURONIUM BROMIDE 10 MG: 10 INJECTION INTRAVENOUS at 10:58

## 2018-03-12 RX ADMIN — ONDANSETRON 4 MG: 2 INJECTION INTRAMUSCULAR; INTRAVENOUS at 10:18

## 2018-03-12 RX ADMIN — FENTANYL CITRATE 100 MCG: 50 INJECTION INTRAMUSCULAR; INTRAVENOUS at 07:52

## 2018-03-12 RX ADMIN — OXYCODONE HYDROCHLORIDE AND ACETAMINOPHEN 1 TABLET: 7.5; 325 TABLET ORAL at 22:04

## 2018-03-12 RX ADMIN — FENTANYL CITRATE 50 MCG: 50 INJECTION, SOLUTION INTRAMUSCULAR; INTRAVENOUS at 12:13

## 2018-03-12 RX ADMIN — DEXAMETHASONE SODIUM PHOSPHATE 8 MG: 10 INJECTION INTRAMUSCULAR; INTRAVENOUS at 08:05

## 2018-03-12 RX ADMIN — SODIUM CHLORIDE, POTASSIUM CHLORIDE, SODIUM LACTATE AND CALCIUM CHLORIDE: 600; 310; 30; 20 INJECTION, SOLUTION INTRAVENOUS at 07:48

## 2018-03-12 RX ADMIN — SUGAMMADEX 350 MG: 100 INJECTION, SOLUTION INTRAVENOUS at 11:24

## 2018-03-12 RX ADMIN — CEFAZOLIN SODIUM 2 G: 2 INJECTION, SOLUTION INTRAVENOUS at 10:49

## 2018-03-12 RX ADMIN — ROCURONIUM BROMIDE 50 MG: 10 INJECTION INTRAVENOUS at 07:54

## 2018-03-12 RX ADMIN — OXYCODONE HYDROCHLORIDE AND ACETAMINOPHEN 1 TABLET: 7.5; 325 TABLET ORAL at 18:03

## 2018-03-12 RX ADMIN — ONDANSETRON 4 MG: 2 INJECTION, SOLUTION INTRAMUSCULAR; INTRAVENOUS at 13:40

## 2018-03-12 NOTE — ANESTHESIA POSTPROCEDURE EVALUATION
"Patient: Ifeoma Stout    Procedure Summary     Date:  03/12/18 Room / Location:  [facility] OR  / [facility] MAIN OR    Anesthesia Start:  0748 Anesthesia Stop:  1144    Procedure:  TOTAL HYSTERECTOMY BILATERAL SALPINGECTOMY BILATERAL SALPINGO-OOPHORECTOMY DA CHERY ROBOT (Bilateral Abdomen) Diagnosis:      Surgeon:  Karley Banda MD Provider:  Gavin Franco MD    Anesthesia Type:  general ASA Status:  2          Anesthesia Type: general  Last vitals  BP   163/88 (03/12/18 1215)   Temp   36.3 °C (97.4 °F) (03/12/18 1141)   Pulse   69 (03/12/18 1215)   Resp   16 (03/12/18 1215)     SpO2   100 % (03/12/18 1215)     Post Anesthesia Care and Evaluation    Patient location during evaluation: PACU  Patient participation: complete - patient participated  Level of consciousness: awake and alert  Pain management: adequate  Airway patency: patent  Anesthetic complications: No anesthetic complications    Cardiovascular status: acceptable  Respiratory status: acceptable  Hydration status: acceptable    Comments: /88   Pulse 69   Temp 36.3 °C (97.4 °F) (Oral)   Resp 16   Ht 175.3 cm (69.02\")   Wt 118 kg (260 lb 12.9 oz)   SpO2 100%   BMI 38.50 kg/m²               "

## 2018-03-12 NOTE — OP NOTE
Preop Dx:   SUF    Post-Op Diagnosis Codes:   Same    Surgeon: Dr Banda    Assistant: Dr Perales    Anesthesia: General    Estimated Blood Loss: 100 mL    Specimens:   Order Name Source Comment Collection Info Order Time   TISSUE PATHOLOGY EXAM Uterus with Cervix, Bilateral Tubes and Ovaries  Collected By: Karley Banda MD 3/12/2018 11:14 AM       Complications: None    Findings: uterus, nl tubes and ovaries. Nl bladder mucosa, bilateral urethral orifices with efflux of urine.    Procedure: DaVinci assisted TLH, Bilateral Salpingoophorectomy and Diagnostic Cystoscopy    Detail of Procedure:      After appropriate consents were obtained, pt was taken to the operating room where anesthesia was found to be adequate.    Pt was placed in dorsal lithotomy position, arms tucked in and prepped and draped in usual fashion. Attention was then placed to the perineum where a rojas was placed, followed by uterine manipulator. Then attention was placed to the abdomen where pt was flat and Veress needle introduced and PCO2 in sulfated to archive 25 mmHg. Then under direct visualization 12 mm trocar was introduced and identification of intraperitoneal placement was achieved. Pt then placed in steep trendelenburg and two 8 mm trocars where introduced under direct visualization on right and left lower abdominal area. Then a 10 mm trocar was introduced on right upper abd area. The Da Hector was then docked and using the monopolar scissors and the fenestrated bipolar, bilateral tubes where identified, cauterized and removed. Bilateral infundibulopelvic ligament was identified and cauterized and cut. Bilateral ureters where traced after down to the pelvis and noted good motion. Then the round ligament was cauterized and cut and the broad ligament was dissected off. Bilateral uterine arteries where skeletonization and cauterized and transected. Hemostasis was assured. The Cardinal ligaments were dissected all the way down to  uterosacral lig and that level the cervix was cored all around. The pelvis was copiously irrigated. V lock suture used to run the cuff from each end meeting at the middle, taking 1 cm from anterior and 1 cm from pos vaginal cuff edges.   The specimen was then removed extending the abdominal incision to 4 cm and placing the abdominal extractor device. After extraction of specimen hemostasis was assured.    Poor man cystoscopy performed with findings as above. All instruments where removed from vagina and abdomen. The fascia of the abdominal incision was closed with 0 vicryl, followed by skin closure with 4-0 vicryl. Other 8mm and 10mm incision were closed with 4-0 monocryl.    Sponge, laps and needle counts correct x 2.      Pt taken to recovery in stable condition. Family updated on the status of pt by me.

## 2018-03-12 NOTE — PLAN OF CARE
Problem: Patient Care Overview  Goal: Plan of Care Review   03/12/18 0605   Coping/Psychosocial   Plan of Care Reviewed With patient;spouse   Plan of Care Review   Progress no change     Goal: Individualization and Mutuality   03/12/18 0605   Individualization   Patient Specific Preferences call Ifeoma   Patient Specific Goals (Include Timeframe) no more pain    Patient Specific Interventions teach S&S of infection   Mutuality/Individual Preferences   What Anxieties, Fears, Concerns, or Questions Do You Have About Your Care? none   What Information Would Help Us Give You More Personalized Care? none   How Would You and/or Your Support Person Like to Participate in Your Care? none     Goal: Discharge Needs Assessment   03/12/18 0605   Discharge Needs Assessment   Readmission Within the Last 30 Days no previous admission in last 30 days   Concerns to be Addressed no discharge needs identified   Transportation Concerns car, none   Transportation Anticipated family or friend will provide   Equipment Needed After Discharge none   Disability   Equipment Currently Used at Home none

## 2018-03-12 NOTE — ANESTHESIA PREPROCEDURE EVALUATION
Anesthesia Evaluation     Patient summary reviewed and Nursing notes reviewed   history of anesthetic complications: PONV  NPO Solid Status: > 8 hours  NPO Liquid Status: > 4 hours           Airway   Dental      Pulmonary - negative pulmonary ROS and normal exam    breath sounds clear to auscultation  Cardiovascular - normal exam    ECG reviewed    (+) hypertension well controlled,       Neuro/Psych- negative ROS  GI/Hepatic/Renal/Endo    (+) obesity,  GERD,      Musculoskeletal (-) negative ROS    Abdominal    Substance History - negative use     OB/GYN          Other      history of cancer                    Anesthesia Plan    ASA 2     general     intravenous induction   Anesthetic plan and risks discussed with patient.

## 2018-03-12 NOTE — PLAN OF CARE
Problem: Patient Care Overview  Goal: Plan of Care Review   03/12/18 1413   Coping/Psychosocial   Plan of Care Reviewed With patient;spouse   OTHER   Outcome Summary lap sites x 4 dry & intact with bandaid dressings, grayson-pad with scant to small amount vaginal drainage,, ice pack to abdomen, medicated for pain & nausea with good relief, lungs clear but diminished in bases, instructed in IS use, f/c to bsd draining dark yellow urine, taking clear liquids will advance as tolerated,  at bedside, very supportive     Goal: Discharge Needs Assessment  Outcome: Ongoing (interventions implemented as appropriate)    Goal: Interprofessional Rounds/Family Conf  Outcome: Ongoing (interventions implemented as appropriate)      Problem: Hysterectomy (Adult)  Goal: Signs and Symptoms of Listed Potential Problems Will be Absent, Minimized or Managed (Hysterectomy)  Outcome: Ongoing (interventions implemented as appropriate)    Goal: Anesthesia/Sedation Recovery  Outcome: Ongoing (interventions implemented as appropriate)

## 2018-03-12 NOTE — INTERVAL H&P NOTE
H&P reviewed. The patient was examined and there are no changes to the H&P.    Karley Banda MD  3/12/2018  7:40 AM

## 2018-03-12 NOTE — ANESTHESIA PROCEDURE NOTES
Airway  Urgency: elective    Airway not difficult    General Information and Staff    Patient location during procedure: OR  Anesthesiologist: KOKI TRACY  CRNA: JAVI CISNEROS    Indications and Patient Condition  Indications for airway management: airway protection    Preoxygenated: yes  MILS maintained throughout  Mask difficulty assessment: 1 - vent by mask    Final Airway Details  Final airway type: endotracheal airway      Successful airway: ETT  Cuffed: yes   Successful intubation technique: direct laryngoscopy  Facilitating devices/methods: intubating stylet  Endotracheal tube insertion site: oral  Blade: Marla  Blade size: #4  ETT size: 7.5 mm  Cormack-Lehane Classification: grade IIa - partial view of glottis  Placement verified by: chest auscultation and capnometry   Measured from: teeth  ETT to teeth (cm): 22  Number of attempts at approach: 1    Additional Comments  Atraumatic. Dentition as preop.

## 2018-03-13 VITALS
HEIGHT: 69 IN | SYSTOLIC BLOOD PRESSURE: 91 MMHG | DIASTOLIC BLOOD PRESSURE: 57 MMHG | RESPIRATION RATE: 16 BRPM | TEMPERATURE: 96.9 F | BODY MASS INDEX: 38.63 KG/M2 | HEART RATE: 51 BPM | OXYGEN SATURATION: 100 % | WEIGHT: 260.8 LBS

## 2018-03-13 LAB
ALBUMIN SERPL-MCNC: 3.3 G/DL (ref 3.5–5.2)
ALBUMIN/GLOB SERPL: 1 G/DL
ALP SERPL-CCNC: 72 U/L (ref 39–117)
ALT SERPL W P-5'-P-CCNC: 23 U/L (ref 1–33)
ANION GAP SERPL CALCULATED.3IONS-SCNC: 8.4 MMOL/L
AST SERPL-CCNC: 16 U/L (ref 1–32)
BACTERIA UR QL AUTO: ABNORMAL /HPF
BILIRUB SERPL-MCNC: 0.3 MG/DL (ref 0.1–1.2)
BILIRUB UR QL STRIP: NEGATIVE
BUN BLD-MCNC: 10 MG/DL (ref 8–23)
BUN/CREAT SERPL: 12.5 (ref 7–25)
CALCIUM SPEC-SCNC: 8.1 MG/DL (ref 8.6–10.5)
CHLORIDE SERPL-SCNC: 100 MMOL/L (ref 98–107)
CLARITY UR: ABNORMAL
CO2 SERPL-SCNC: 30.6 MMOL/L (ref 22–29)
COLOR UR: YELLOW
CREAT BLD-MCNC: 0.8 MG/DL (ref 0.57–1)
CYTO UR: NORMAL
DEPRECATED RDW RBC AUTO: 50.9 FL (ref 37–54)
ERYTHROCYTE [DISTWIDTH] IN BLOOD BY AUTOMATED COUNT: 14.4 % (ref 11.7–13)
GFR SERPL CREATININE-BSD FRML MDRD: 73 ML/MIN/1.73
GLOBULIN UR ELPH-MCNC: 3.2 GM/DL
GLUCOSE BLD-MCNC: 135 MG/DL (ref 65–99)
GLUCOSE UR STRIP-MCNC: NEGATIVE MG/DL
HCT VFR BLD AUTO: 37.9 % (ref 35.6–45.5)
HGB BLD-MCNC: 12 G/DL (ref 11.9–15.5)
HGB UR QL STRIP.AUTO: ABNORMAL
HYALINE CASTS UR QL AUTO: ABNORMAL /LPF
KETONES UR QL STRIP: NEGATIVE
LAB AP CASE REPORT: NORMAL
LEUKOCYTE ESTERASE UR QL STRIP.AUTO: ABNORMAL
Lab: NORMAL
MCH RBC QN AUTO: 30.5 PG (ref 26.9–32)
MCHC RBC AUTO-ENTMCNC: 31.7 G/DL (ref 32.4–36.3)
MCV RBC AUTO: 96.4 FL (ref 80.5–98.2)
NITRITE UR QL STRIP: NEGATIVE
PATH REPORT.FINAL DX SPEC: NORMAL
PATH REPORT.GROSS SPEC: NORMAL
PH UR STRIP.AUTO: 6 [PH] (ref 5–8)
PLATELET # BLD AUTO: 248 10*3/MM3 (ref 140–500)
PMV BLD AUTO: 10.5 FL (ref 6–12)
POTASSIUM BLD-SCNC: 4.2 MMOL/L (ref 3.5–5.2)
PROT SERPL-MCNC: 6.5 G/DL (ref 6–8.5)
PROT UR QL STRIP: ABNORMAL
RBC # BLD AUTO: 3.93 10*6/MM3 (ref 3.9–5.2)
RBC # UR: ABNORMAL /HPF
REF LAB TEST METHOD: ABNORMAL
SODIUM BLD-SCNC: 139 MMOL/L (ref 136–145)
SP GR UR STRIP: 1.01 (ref 1–1.03)
SQUAMOUS #/AREA URNS HPF: ABNORMAL /HPF
UROBILINOGEN UR QL STRIP: ABNORMAL
WBC NRBC COR # BLD: 17.96 10*3/MM3 (ref 4.5–10.7)
WBC UR QL AUTO: ABNORMAL /HPF

## 2018-03-13 PROCEDURE — 87086 URINE CULTURE/COLONY COUNT: CPT | Performed by: PHYSICIAN ASSISTANT

## 2018-03-13 PROCEDURE — 85027 COMPLETE CBC AUTOMATED: CPT | Performed by: OBSTETRICS & GYNECOLOGY

## 2018-03-13 PROCEDURE — 80053 COMPREHEN METABOLIC PANEL: CPT | Performed by: OBSTETRICS & GYNECOLOGY

## 2018-03-13 PROCEDURE — 81001 URINALYSIS AUTO W/SCOPE: CPT | Performed by: PHYSICIAN ASSISTANT

## 2018-03-13 RX ADMIN — SIMETHICONE CHEW TAB 80 MG 80 MG: 80 TABLET ORAL at 13:40

## 2018-03-13 RX ADMIN — ALLOPURINOL 100 MG: 100 TABLET ORAL at 08:16

## 2018-03-13 RX ADMIN — PANTOPRAZOLE SODIUM 40 MG: 40 TABLET, DELAYED RELEASE ORAL at 06:24

## 2018-03-13 RX ADMIN — SIMETHICONE CHEW TAB 80 MG 80 MG: 80 TABLET ORAL at 08:19

## 2018-03-13 RX ADMIN — OXYCODONE HYDROCHLORIDE AND ACETAMINOPHEN 1 TABLET: 7.5; 325 TABLET ORAL at 08:16

## 2018-03-13 RX ADMIN — IBUPROFEN 400 MG: 400 TABLET ORAL at 08:16

## 2018-03-13 RX ADMIN — OXYCODONE HYDROCHLORIDE AND ACETAMINOPHEN 1 TABLET: 7.5; 325 TABLET ORAL at 13:40

## 2018-03-13 NOTE — PLAN OF CARE
Problem: Patient Care Overview  Goal: Plan of Care Review  Outcome: Ongoing (interventions implemented as appropriate)   03/13/18 0555   Coping/Psychosocial   Plan of Care Reviewed With patient   Plan of Care Review   Progress improving   OTHER   Outcome Summary Vital signs stable. Tolerating diet. Iv fluids continued. Incision CD&I. Prn analgesic given per emar. Encouraged to use IS and to ambulate.     Goal: Individualization and Mutuality  Outcome: Ongoing (interventions implemented as appropriate)      Problem: Hysterectomy (Adult)  Goal: Signs and Symptoms of Listed Potential Problems Will be Absent, Minimized or Managed (Hysterectomy)  Outcome: Ongoing (interventions implemented as appropriate)      Problem: Fall Risk (Adult)  Goal: Identify Related Risk Factors and Signs and Symptoms  Outcome: Ongoing (interventions implemented as appropriate)    Goal: Absence of Fall  Outcome: Ongoing (interventions implemented as appropriate)

## 2018-03-13 NOTE — DISCHARGE SUMMARY
[facility]  POST OP  PROGRESS NOTE    Patient Name: Ifeoma Stout  :  1955  MRN:  [id number]      POD1   Subjective     Patient reports:    Pain is well controlled. Denies nausea and vomiting. Tolerating po.   Voiding and ambulating without difficulty.       Objective       Vitals: Vital Signs Range for the last 24 hours  Temperature: Temp:  [96.3 °F (35.7 °C)-97.5 °F (36.4 °C)] 96.9 °F (36.1 °C)       BP: BP: ()/(57-88) 91/57   Pulse: Heart Rate:  [51-76] 51   Respirations: Resp:  [14-18] 16         Intake/Output Summary (Last 24 hours) at 18 0841  Last data filed at 18 0641   Gross per 24 hour   Intake             3438 ml   Output             1825 ml   Net             1613 ml                                             Physical Exam     General  Alert in NAD    Abdomen Soft, non-distended, appropriately tender  Lungs: CTA b/l    Incision  Clean, dry and intact       Extremities Calves NT bilaterally          Lab results reviewed:  CBC: Lab Results   Component Value Date    WBC 17.96 (H) 2018    HGB 12.0 2018    HCT 37.9 2018          Assessment/Plan     POD 1 - Doing well. Hemodynamically stable. Intraoperative    findings reviewed with the patient.   Will check UA due to elevated wbc, pt afebrile.     Plan:  Stable for discharge. Post op instruction discussed. Follow up in 2 weeks.        Active Problems:    Uterine fibroid                   PATRICIA Kam  3/13/2018  8:41 AM

## 2018-03-13 NOTE — PROGRESS NOTES
Case Management Discharge Note         Destination     No service coordination in this encounter.      Durable Medical Equipment     No service coordination in this encounter.      Dialysis/Infusion     No service coordination in this encounter.      Home Medical Care     No service coordination in this encounter.      Social Care     No service coordination in this encounter.        Other:  (Private vehicle)    Final Discharge Disposition Code: 01 - home or self-care

## 2018-03-14 LAB
BACTERIA SPEC AEROBE CULT: ABNORMAL
BACTERIA SPEC AEROBE CULT: ABNORMAL

## 2019-02-19 ENCOUNTER — CONVERSION ENCOUNTER (OUTPATIENT)
Dept: FAMILY MEDICINE CLINIC | Facility: CLINIC | Age: 64
End: 2019-02-19

## 2019-02-19 ENCOUNTER — HOSPITAL ENCOUNTER (OUTPATIENT)
Dept: FAMILY MEDICINE CLINIC | Facility: CLINIC | Age: 64
Discharge: HOME | End: 2019-02-19
Attending: FAMILY MEDICINE

## 2019-02-19 ENCOUNTER — OFFICE VISIT CONVERTED (OUTPATIENT)
Dept: FAMILY MEDICINE CLINIC | Facility: CLINIC | Age: 64
End: 2019-02-19
Attending: FAMILY MEDICINE

## 2019-02-19 LAB
25(OH)D3 SERPL-MCNC: 48.1 NG/ML (ref 30–100)
ALBUMIN SERPL-MCNC: 4 G/DL (ref 3.5–5)
ALBUMIN/GLOB SERPL: 1.1 {RATIO} (ref 1.4–2.6)
ALP SERPL-CCNC: 102 U/L (ref 43–160)
ALT SERPL-CCNC: 25 U/L (ref 10–40)
ANION GAP SERPL CALC-SCNC: 16 MMOL/L (ref 8–19)
AST SERPL-CCNC: 19 U/L (ref 15–50)
BASOPHILS # BLD AUTO: 0.07 10*3/UL (ref 0–0.2)
BASOPHILS NFR BLD AUTO: 0.7 % (ref 0–3)
BILIRUB SERPL-MCNC: 0.36 MG/DL (ref 0.2–1.3)
BUN SERPL-MCNC: 11 MG/DL (ref 5–25)
BUN/CREAT SERPL: 12 {RATIO} (ref 6–20)
CALCIUM SERPL-MCNC: 9.3 MG/DL (ref 8.7–10.4)
CHLORIDE SERPL-SCNC: 99 MMOL/L (ref 99–111)
CHOLEST SERPL-MCNC: 151 MG/DL (ref 107–200)
CHOLEST/HDLC SERPL: 3 {RATIO} (ref 3–6)
CONV ABS IMM GRAN: 0.03 10*3/UL (ref 0–0.2)
CONV CO2: 29 MMOL/L (ref 22–32)
CONV IMMATURE GRAN: 0.3 % (ref 0–1.8)
CONV TOTAL PROTEIN: 7.8 G/DL (ref 6.3–8.2)
CREAT UR-MCNC: 0.89 MG/DL (ref 0.5–0.9)
DEPRECATED RDW RBC AUTO: 46.7 FL (ref 36.4–46.3)
EOSINOPHIL # BLD AUTO: 0.18 10*3/UL (ref 0–0.7)
EOSINOPHIL # BLD AUTO: 1.9 % (ref 0–7)
ERYTHROCYTE [DISTWIDTH] IN BLOOD BY AUTOMATED COUNT: 13.7 % (ref 11.7–14.4)
GFR SERPLBLD BASED ON 1.73 SQ M-ARVRAT: >60 ML/MIN/{1.73_M2}
GLOBULIN UR ELPH-MCNC: 3.8 G/DL (ref 2–3.5)
GLUCOSE SERPL-MCNC: 138 MG/DL (ref 65–99)
HBA1C MFR BLD: 13.5 G/DL (ref 12–16)
HCT VFR BLD AUTO: 43.1 % (ref 37–47)
HDLC SERPL-MCNC: 50 MG/DL (ref 40–60)
LDLC SERPL CALC-MCNC: 81 MG/DL (ref 70–100)
LYMPHOCYTES # BLD AUTO: 3.25 10*3/UL (ref 1–5)
MCH RBC QN AUTO: 29.3 PG (ref 27–31)
MCHC RBC AUTO-ENTMCNC: 31.3 G/DL (ref 33–37)
MCV RBC AUTO: 93.5 FL (ref 81–99)
MONOCYTES # BLD AUTO: 0.63 10*3/UL (ref 0.2–1.2)
MONOCYTES NFR BLD AUTO: 6.7 % (ref 3–10)
NEUTROPHILS # BLD AUTO: 5.26 10*3/UL (ref 2–8)
NEUTROPHILS NFR BLD AUTO: 55.9 % (ref 30–85)
NRBC CBCN: 0 % (ref 0–0.7)
OSMOLALITY SERPL CALC.SUM OF ELEC: 292 MOSM/KG (ref 273–304)
PLATELET # BLD AUTO: 289 10*3/UL (ref 130–400)
PMV BLD AUTO: 10.4 FL (ref 9.4–12.3)
POTASSIUM SERPL-SCNC: 3.9 MMOL/L (ref 3.5–5.3)
RBC # BLD AUTO: 4.61 10*6/UL (ref 4.2–5.4)
SODIUM SERPL-SCNC: 140 MMOL/L (ref 135–147)
TRIGL SERPL-MCNC: 99 MG/DL (ref 40–150)
URATE SERPL-MCNC: 7.4 MG/DL (ref 2.5–7.5)
VARIANT LYMPHS NFR BLD MANUAL: 34.5 % (ref 20–45)
VLDLC SERPL-MCNC: 20 MG/DL (ref 5–37)
WBC # BLD AUTO: 9.42 10*3/UL (ref 4.8–10.8)

## 2019-05-02 ENCOUNTER — HOSPITAL ENCOUNTER (OUTPATIENT)
Dept: GASTROENTEROLOGY | Facility: HOSPITAL | Age: 64
Setting detail: HOSPITAL OUTPATIENT SURGERY
Discharge: HOME | End: 2019-05-02
Attending: INTERNAL MEDICINE

## 2019-10-03 ENCOUNTER — OFFICE VISIT CONVERTED (OUTPATIENT)
Dept: FAMILY MEDICINE CLINIC | Facility: CLINIC | Age: 64
End: 2019-10-03
Attending: FAMILY MEDICINE

## 2020-06-11 ENCOUNTER — HOSPITAL ENCOUNTER (OUTPATIENT)
Dept: LAB | Facility: HOSPITAL | Age: 65
Discharge: HOME | End: 2020-06-11

## 2020-06-11 LAB
ALBUMIN SERPL-MCNC: 4.2 G/DL (ref 3.5–5)
ALBUMIN/GLOB SERPL: 1.3 {RATIO} (ref 1.4–2.6)
ALP SERPL-CCNC: 120 U/L (ref 43–160)
ALT SERPL-CCNC: 25 U/L (ref 10–40)
AMPHETAMINES UR QL SCN: NEGATIVE
ANION GAP SERPL CALC-SCNC: 22 MMOL/L (ref 8–19)
AST SERPL-CCNC: 19 U/L (ref 15–50)
BARBITURATES UR QL SCN: NEGATIVE
BENZODIAZ UR QL SCN: NEGATIVE
BILIRUB SERPL-MCNC: 0.37 MG/DL (ref 0.2–1.3)
BUN SERPL-MCNC: 17 MG/DL (ref 5–25)
BUN/CREAT SERPL: 19 {RATIO} (ref 6–20)
CALCIUM SERPL-MCNC: 9.3 MG/DL (ref 8.7–10.4)
CHLORIDE SERPL-SCNC: 98 MMOL/L (ref 99–111)
CONV CO2: 22 MMOL/L (ref 22–32)
CONV COCAINE, UR: NEGATIVE
CONV TOTAL PROTEIN: 7.5 G/DL (ref 6.3–8.2)
CREAT UR-MCNC: 0.91 MG/DL (ref 0.5–0.9)
EST. AVERAGE GLUCOSE BLD GHB EST-MCNC: 197 MG/DL
GFR SERPLBLD BASED ON 1.73 SQ M-ARVRAT: >60 ML/MIN/{1.73_M2}
GLOBULIN UR ELPH-MCNC: 3.3 G/DL (ref 2–3.5)
GLUCOSE SERPL-MCNC: 237 MG/DL (ref 65–99)
HBA1C MFR BLD: 8.5 % (ref 3.5–5.7)
METHADONE UR QL SCN: NEGATIVE
OPIATES TESTED UR SCN: NEGATIVE
OSMOLALITY SERPL CALC.SUM OF ELEC: 295 MOSM/KG (ref 273–304)
OXYCODONE UR QL SCN: NEGATIVE
PCP UR QL: NEGATIVE
POTASSIUM SERPL-SCNC: 3.9 MMOL/L (ref 3.5–5.3)
SODIUM SERPL-SCNC: 138 MMOL/L (ref 135–147)
T4 FREE SERPL-MCNC: 1.5 NG/DL (ref 0.9–1.8)
THC SERPLBLD CFM-MCNC: NEGATIVE NG/ML
TSH SERPL-ACNC: 3.25 M[IU]/L (ref 0.27–4.2)

## 2020-09-16 ENCOUNTER — HOSPITAL ENCOUNTER (OUTPATIENT)
Dept: DIABETES SERVICES | Facility: HOSPITAL | Age: 65
Setting detail: RECURRING SERIES
Discharge: HOME | End: 2020-12-15

## 2020-10-12 ENCOUNTER — HOSPITAL ENCOUNTER (OUTPATIENT)
Dept: MAMMOGRAPHY | Facility: HOSPITAL | Age: 65
Discharge: HOME | End: 2020-10-12

## 2021-05-15 VITALS
OXYGEN SATURATION: 99 % | BODY MASS INDEX: 37.83 KG/M2 | HEART RATE: 61 BPM | DIASTOLIC BLOOD PRESSURE: 72 MMHG | SYSTOLIC BLOOD PRESSURE: 126 MMHG | WEIGHT: 264.25 LBS | TEMPERATURE: 98.2 F | HEIGHT: 70 IN

## 2021-05-15 VITALS
BODY MASS INDEX: 37.65 KG/M2 | DIASTOLIC BLOOD PRESSURE: 74 MMHG | OXYGEN SATURATION: 96 % | WEIGHT: 263 LBS | HEIGHT: 70 IN | SYSTOLIC BLOOD PRESSURE: 126 MMHG | HEART RATE: 63 BPM | TEMPERATURE: 97.9 F

## 2021-11-09 ENCOUNTER — OFFICE VISIT (OUTPATIENT)
Dept: FAMILY MEDICINE CLINIC | Facility: CLINIC | Age: 66
End: 2021-11-09

## 2021-11-09 VITALS
HEIGHT: 70 IN | OXYGEN SATURATION: 97 % | DIASTOLIC BLOOD PRESSURE: 59 MMHG | WEIGHT: 258 LBS | HEART RATE: 66 BPM | SYSTOLIC BLOOD PRESSURE: 127 MMHG | TEMPERATURE: 97.3 F | BODY MASS INDEX: 36.94 KG/M2

## 2021-11-09 DIAGNOSIS — M1A.0720 IDIOPATHIC CHRONIC GOUT OF LEFT FOOT WITHOUT TOPHUS: ICD-10-CM

## 2021-11-09 DIAGNOSIS — I10 PRIMARY HYPERTENSION: Primary | ICD-10-CM

## 2021-11-09 DIAGNOSIS — Z17.0 MALIGNANT NEOPLASM OF LEFT BREAST IN FEMALE, ESTROGEN RECEPTOR POSITIVE, UNSPECIFIED SITE OF BREAST (HCC): ICD-10-CM

## 2021-11-09 DIAGNOSIS — C50.912 MALIGNANT NEOPLASM OF LEFT BREAST IN FEMALE, ESTROGEN RECEPTOR POSITIVE, UNSPECIFIED SITE OF BREAST (HCC): ICD-10-CM

## 2021-11-09 DIAGNOSIS — E66.9 OBESITY WITH BODY MASS INDEX 30 OR GREATER: ICD-10-CM

## 2021-11-09 DIAGNOSIS — G25.81 RESTLESS LEG SYNDROME: ICD-10-CM

## 2021-11-09 DIAGNOSIS — E11.9 TYPE 2 DIABETES MELLITUS WITHOUT COMPLICATION, WITHOUT LONG-TERM CURRENT USE OF INSULIN (HCC): ICD-10-CM

## 2021-11-09 PROBLEM — Z78.0 MENOPAUSE: Status: ACTIVE | Noted: 2017-11-21

## 2021-11-09 PROBLEM — I25.83 CORONARY ARTERY DISEASE DUE TO LIPID RICH PLAQUE: Status: ACTIVE | Noted: 2021-01-05

## 2021-11-09 PROBLEM — K22.4 ESOPHAGEAL DYSMOTILITY: Status: ACTIVE | Noted: 2017-02-02

## 2021-11-09 PROBLEM — R94.31 ELECTROCARDIOGRAM ABNORMAL: Status: ACTIVE | Noted: 2020-10-01

## 2021-11-09 PROBLEM — N93.8 DYSFUNCTIONAL UTERINE BLEEDING: Status: ACTIVE | Noted: 2017-12-21

## 2021-11-09 PROBLEM — M10.00 PRIMARY GOUT: Status: ACTIVE | Noted: 2017-07-12

## 2021-11-09 PROBLEM — M20.10 HALLUX VALGUS: Status: ACTIVE | Noted: 2021-11-09

## 2021-11-09 PROBLEM — T14.8XXA BROKEN BONES: Status: ACTIVE | Noted: 2021-11-09

## 2021-11-09 PROBLEM — R94.39 CARDIOVASCULAR STRESS TEST ABNORMAL: Status: ACTIVE | Noted: 2021-01-05

## 2021-11-09 PROBLEM — C50.919 MALIGNANT NEOPLASM OF BREAST: Status: ACTIVE | Noted: 2021-01-05

## 2021-11-09 PROBLEM — I25.10 CORONARY ARTERY DISEASE DUE TO LIPID RICH PLAQUE: Status: ACTIVE | Noted: 2021-01-05

## 2021-11-09 PROBLEM — M10.9 GOUT: Status: ACTIVE | Noted: 2017-07-12

## 2021-11-09 PROBLEM — Z13.0 SCREENING FOR OTHER DISORDERS OF BLOOD AND BLOOD-FORMING ORGANS: Status: ACTIVE | Noted: 2020-10-01

## 2021-11-09 PROBLEM — M79.671 PAIN IN RIGHT FOOT: Status: ACTIVE | Noted: 2021-11-09

## 2021-11-09 PROBLEM — M79.609 PAIN IN LIMB: Status: ACTIVE | Noted: 2021-11-09

## 2021-11-09 LAB
ALBUMIN SERPL-MCNC: 4.1 G/DL (ref 3.5–5.2)
ALBUMIN UR-MCNC: <1.2 MG/DL
ALBUMIN/GLOB SERPL: 1.3 G/DL
ALP SERPL-CCNC: 112 U/L (ref 39–117)
ALT SERPL W P-5'-P-CCNC: 23 U/L (ref 1–33)
ANION GAP SERPL CALCULATED.3IONS-SCNC: 11 MMOL/L (ref 5–15)
AST SERPL-CCNC: 19 U/L (ref 1–32)
BILIRUB SERPL-MCNC: 0.4 MG/DL (ref 0–1.2)
BUN SERPL-MCNC: 12 MG/DL (ref 8–23)
BUN/CREAT SERPL: 17.1 (ref 7–25)
CALCIUM SPEC-SCNC: 9.4 MG/DL (ref 8.6–10.5)
CHLORIDE SERPL-SCNC: 98 MMOL/L (ref 98–107)
CHOLEST SERPL-MCNC: 139 MG/DL (ref 0–200)
CO2 SERPL-SCNC: 28 MMOL/L (ref 22–29)
CREAT SERPL-MCNC: 0.7 MG/DL (ref 0.57–1)
CREAT UR-MCNC: 116.4 MG/DL
GFR SERPL CREATININE-BSD FRML MDRD: 85 ML/MIN/1.73
GLOBULIN UR ELPH-MCNC: 3.2 GM/DL
GLUCOSE SERPL-MCNC: 153 MG/DL (ref 65–99)
HBA1C MFR BLD: 6.8 % (ref 4.8–5.6)
HDLC SERPL-MCNC: 50 MG/DL (ref 40–60)
LDLC SERPL CALC-MCNC: 70 MG/DL (ref 0–100)
LDLC/HDLC SERPL: 1.38 {RATIO}
MICROALBUMIN/CREAT UR: NORMAL MG/G{CREAT}
POTASSIUM SERPL-SCNC: 4.2 MMOL/L (ref 3.5–5.2)
PROT SERPL-MCNC: 7.3 G/DL (ref 6–8.5)
SODIUM SERPL-SCNC: 137 MMOL/L (ref 136–145)
T4 FREE SERPL-MCNC: 1.35 NG/DL (ref 0.93–1.7)
TRIGL SERPL-MCNC: 100 MG/DL (ref 0–150)
TSH SERPL DL<=0.05 MIU/L-ACNC: 1.89 UIU/ML (ref 0.27–4.2)
URATE SERPL-MCNC: 4.6 MG/DL (ref 2.4–5.7)
VLDLC SERPL-MCNC: 19 MG/DL (ref 5–40)

## 2021-11-09 PROCEDURE — 83036 HEMOGLOBIN GLYCOSYLATED A1C: CPT | Performed by: FAMILY MEDICINE

## 2021-11-09 PROCEDURE — 36415 COLL VENOUS BLD VENIPUNCTURE: CPT | Performed by: FAMILY MEDICINE

## 2021-11-09 PROCEDURE — 82570 ASSAY OF URINE CREATININE: CPT | Performed by: FAMILY MEDICINE

## 2021-11-09 PROCEDURE — 84443 ASSAY THYROID STIM HORMONE: CPT | Performed by: FAMILY MEDICINE

## 2021-11-09 PROCEDURE — 99204 OFFICE O/P NEW MOD 45 MIN: CPT | Performed by: FAMILY MEDICINE

## 2021-11-09 PROCEDURE — 82043 UR ALBUMIN QUANTITATIVE: CPT | Performed by: FAMILY MEDICINE

## 2021-11-09 PROCEDURE — 80053 COMPREHEN METABOLIC PANEL: CPT | Performed by: FAMILY MEDICINE

## 2021-11-09 PROCEDURE — 84550 ASSAY OF BLOOD/URIC ACID: CPT | Performed by: FAMILY MEDICINE

## 2021-11-09 PROCEDURE — 80061 LIPID PANEL: CPT | Performed by: FAMILY MEDICINE

## 2021-11-09 PROCEDURE — 84439 ASSAY OF FREE THYROXINE: CPT | Performed by: FAMILY MEDICINE

## 2021-11-09 RX ORDER — ROPINIROLE 0.5 MG/1
1 TABLET, FILM COATED ORAL DAILY
COMMUNITY
Start: 2021-11-03 | End: 2023-02-07

## 2021-11-09 RX ORDER — DULAGLUTIDE 0.75 MG/.5ML
0.75 INJECTION, SOLUTION SUBCUTANEOUS WEEKLY
Qty: 4 PEN | Refills: 0 | Status: SHIPPED | OUTPATIENT
Start: 2021-11-09 | End: 2021-12-07

## 2021-11-09 RX ORDER — PRAVASTATIN SODIUM 20 MG
1 TABLET ORAL DAILY
COMMUNITY
Start: 2021-11-05 | End: 2023-03-13

## 2021-11-09 RX ORDER — LISINOPRIL AND HYDROCHLOROTHIAZIDE 12.5; 1 MG/1; MG/1
1 TABLET ORAL DAILY
COMMUNITY
Start: 2021-07-23

## 2021-11-09 RX ORDER — DULAGLUTIDE 1.5 MG/.5ML
1.5 INJECTION, SOLUTION SUBCUTANEOUS WEEKLY
Qty: 4 PEN | Refills: 4 | Status: SHIPPED | OUTPATIENT
Start: 2021-11-09 | End: 2021-12-07

## 2021-11-09 RX ORDER — ALLOPURINOL 300 MG/1
1 TABLET ORAL DAILY
COMMUNITY
Start: 2021-11-03 | End: 2022-06-07 | Stop reason: SDUPTHER

## 2021-11-09 RX ORDER — ALPRAZOLAM 0.5 MG/1
0.5 TABLET ORAL 2 TIMES DAILY PRN
Qty: 30 TABLET | Refills: 0 | Status: SHIPPED | OUTPATIENT
Start: 2021-11-09 | End: 2021-12-09

## 2021-11-09 RX ORDER — METFORMIN HYDROCHLORIDE 500 MG/1
1000 TABLET, EXTENDED RELEASE ORAL DAILY
COMMUNITY
End: 2021-11-09

## 2021-11-09 RX ORDER — METOPROLOL SUCCINATE 100 MG/1
1 TABLET, EXTENDED RELEASE ORAL DAILY
COMMUNITY
Start: 2021-10-12 | End: 2023-02-01

## 2021-11-09 NOTE — PROGRESS NOTES
Chief Complaint   Patient presents with   • Establish Care     Previous PCP- Keely Fya    • Med Refill        Subjective     Ifeoma Stout  has a past medical history of Colon cancer screening (05/02/2019), History of chemotherapy, History of staph infection, Hypertension, Migraine, PONV (postoperative nausea and vomiting), Post-menopausal bleeding, and Uterine fibroid.    Establish care-she states they originally moved to [place] from [place].  Was originally in [place] but has recently moved to [place] and needs to establish care locally.    Breast cancer-she is diagnosed in 2006 with breast cancer.  This initially localized to her left breast.  She did subsequent bilateral mastectomy with reconstruction.  She said it was stage Ia.  She had 6 rounds of chemotherapy only.  She has not had any reoccurrence and she season oncologist up at [facility].    Type 2 diabetes-she checks her blood sugars on a daily basis.  She states they range from .  She states her last A1c was several months ago.    Hypertension-she checks her blood pressure outside the office on a regular basis to.  States it ranges 120-130/60 to 70s.    Hyperlipidemia-with her history of diabetes they placed her on a statin for increased risk reduction.      PHQ-2 Depression Screening  Little interest or pleasure in doing things? 0   Feeling down, depressed, or hopeless? 0   PHQ-2 Total Score 0   PHQ-9 Depression Screening  Little interest or pleasure in doing things? 0   Feeling down, depressed, or hopeless? 0   Trouble falling or staying asleep, or sleeping too much?     Feeling tired or having little energy?     Poor appetite or overeating?     Feeling bad about yourself - or that you are a failure or have let yourself or your family down?     Trouble concentrating on things, such as reading the newspaper or watching television?     Moving or speaking so slowly that other people could have noticed? Or the opposite -  being so fidgety or restless that you have been moving around a lot more than usual?     Thoughts that you would be better off dead, or of hurting yourself in some way?     PHQ-9 Total Score 0   If you checked off any problems, how difficult have these problems made it for you to do your work, take care of things at home, or get along with other people?       Allergies   Allergen Reactions   • Dapagliflozin Rash     Other reaction(s): Rash     • Adhesive Tape Rash       Prior to Admission medications    Medication Sig Start Date End Date Taking? Authorizing Provider   allopurinol (ZYLOPRIM) 300 MG tablet Take 1 tablet by mouth Daily. 11/3/21  Yes Jeimy Mathis MD   ALPRAZolam (XANAX) 0.5 MG tablet Take 0.5 mg by mouth 2 (Two) Times a Day As Needed for Anxiety.   Yes Jeimy Mathis MD   aspirin 81 MG chewable tablet Chew 81 mg Daily.   Yes Jeimy Mathis MD   cetirizine (zyrTEC) 10 MG tablet Take 10 mg by mouth Daily.   Yes Jeimy Mathis MD   Cholecalciferol (VITAMIN D-3) 1000 units capsule Take 1 Units by mouth Daily.   Yes Jeimy Mathis MD   lisinopril-hydrochlorothiazide (PRINZIDE,ZESTORETIC) 10-12.5 MG per tablet Take 1 tablet by mouth Daily. 7/23/21  Yes Jeimy Mathis MD   metFORMIN ER (GLUCOPHAGE-XR) 500 MG 24 hr tablet Take 1,000 mg by mouth Daily.   Yes Jeimy Mathis MD   metoprolol succinate XL (TOPROL-XL) 100 MG 24 hr tablet Take 1 tablet by mouth Daily. 10/12/21  Yes Jeimy Mathis MD   Multiple Vitamins-Minerals (MULTIVITAMIN ADULTS PO) Take 1 tablet/day by mouth Daily.   Yes Jeiym Mathis MD   pantoprazole (PROTONIX) 40 MG EC tablet Take 40 mg by mouth Every Morning.   Yes Jeimy Mathis MD   pravastatin (PRAVACHOL) 20 MG tablet Take 1 tablet by mouth Daily. 11/5/21  Yes Jeimy Mathis MD   rOPINIRole (REQUIP) 0.5 MG tablet Take 1 tablet by mouth Daily. 11/3/21  Yes Jeimy Mathis MD   allopurinol (ZYLOPRIM) 100 MG  tablet Take 100 mg by mouth Every Morning.  11/9/21  Jeimy Mathis MD   colchicine 0.6 MG tablet Take 0.6 mg by mouth 2 (Two) Times a Day As Needed for Muscle / Joint Pain.  11/9/21  Jeimy Mathis MD   indomethacin (INDOCIN) 50 MG capsule Take 50 mg by mouth 2 (Two) Times a Day As Needed for Mild Pain .  11/9/21  Jeimy Mathis MD   metoprolol tartrate (LOPRESSOR) 100 MG tablet Take 100 mg by mouth Every Morning.  11/9/21  Jeimy Mathis MD   triamterene-hydrochlorothiazide (MAXZIDE) 75-50 MG per tablet Take 1 tablet by mouth Every Morning.  11/9/21  Jeimy Mathis MD        Patient Active Problem List   Diagnosis   • Uterine leiomyoma   • Acquired absence of bilateral breasts and nipples   • Anxiety   • Malignant neoplasm of breast (HCC)   • Abnormal cardiovascular stress test   • Coronary artery disease due to lipid rich plaque   • Depressive disorder   • Dysfunctional uterine bleeding   • Electrocardiogram abnormal   • Esophageal dysmotility   • Hypertension   • Estrogen receptor positive tumor status   • Primary gout   • History of gastroesophageal reflux (GERD)   • Menopause   • Migraine   • Obesity with body mass index 30 or greater   • Restless leg syndrome   • Sleep pattern disturbance   • Diabetes mellitus (HCC)   • Hallux valgus        Past Surgical History:   Procedure Laterality Date   • DILATATION AND CURETTAGE      X2   • DILATATION AND CURETTAGE N/A 1/29/2018    Procedure: DILATATION AND CURETTAGE;  Surgeon: Moriah Schmitz MD;  Location: [facility];  Service:    • ENDOMETRIAL ABLATION     • MASTECTOMY      WITH BREAST RECONSTRUCITON   • MASTECTOMY Bilateral    • TOTAL LAPAROSCOPIC HYSTERECTOMY Bilateral 3/12/2018    Procedure: TOTAL HYSTERECTOMY BILATERAL SALPINGECTOMY BILATERAL SALPINGO-OOPHORECTOMY DA CHERY ROBOT;  Surgeon: Karley Banda MD;  Location: [facility];  Service: DaVinci       Social History     Socioeconomic History   • Marital status:  "   Tobacco Use   • Smoking status: Never Smoker   • Smokeless tobacco: Never Used   Vaping Use   • Vaping Use: Never used   Substance and Sexual Activity   • Alcohol use: Yes     Alcohol/week: 1.0 standard drink     Types: 1 Glasses of wine per week     Comment: WEEK   • Drug use: No   • Sexual activity: Defer       Family History   Problem Relation Age of Onset   • Colon cancer Sister    • Heart disease Father    • Malig Hyperthermia Neg Hx        Family history, surgical history, past medical history, Allergies and med's reviewed with patient today and updated in [facility] EMR.     ROS:  Review of Systems   Constitutional: Negative for fatigue.   HENT: Positive for congestion, postnasal drip and rhinorrhea.    Eyes: Negative for blurred vision and visual disturbance.   Respiratory: Positive for cough. Negative for chest tightness, shortness of breath and wheezing.    Cardiovascular: Negative for chest pain and palpitations.   Gastrointestinal: Negative for abdominal pain, constipation and diarrhea.   Allergic/Immunologic: Positive for environmental allergies.   Neurological: Negative for headache.   Psychiatric/Behavioral: Negative for depressed mood. The patient is not nervous/anxious.        OBJECTIVE:  Vitals:    11/09/21 1001   BP: 127/59   BP Location: Right arm   Patient Position: Sitting   Cuff Size: Large Adult   Pulse: 66   Temp: 97.3 °F (36.3 °C)   SpO2: 97%   Weight: 117 kg (258 lb)   Height: 177.8 cm (70\")     No exam data present   Body mass index is 37.02 kg/m².  No LMP recorded. Patient is postmenopausal.    Physical Exam  Vitals and nursing note reviewed.   Constitutional:       General: She is not in acute distress.     Appearance: Normal appearance. She is obese.   HENT:      Head: Normocephalic.      Right Ear: Tympanic membrane, ear canal and external ear normal.      Left Ear: Tympanic membrane, ear canal and external ear normal.      Nose: Nose normal.      Mouth/Throat:      Mouth: Mucous " membranes are moist.      Pharynx: Oropharynx is clear.   Eyes:      General: No scleral icterus.     Conjunctiva/sclera: Conjunctivae normal.      Pupils: Pupils are equal, round, and reactive to light.   Cardiovascular:      Rate and Rhythm: Normal rate and regular rhythm.      Pulses: Normal pulses.      Heart sounds: Normal heart sounds. No murmur heard.      Pulmonary:      Effort: Pulmonary effort is normal.      Breath sounds: Normal breath sounds. No wheezing, rhonchi or rales.   Musculoskeletal:      Cervical back: No rigidity or tenderness.   Lymphadenopathy:      Cervical: No cervical adenopathy.   Skin:     General: Skin is warm and dry.      Coloration: Skin is not jaundiced.      Findings: No rash.   Neurological:      General: No focal deficit present.      Mental Status: She is alert and oriented to person, place, and time.      Gait: Gait normal.   Psychiatric:         Mood and Affect: Mood normal.         Thought Content: Thought content normal.         Judgment: Judgment normal.         Procedures    No visits with results within 30 Day(s) from this visit.   Latest known visit with results is:   Admission on 03/12/2018, Discharged on 03/13/2018   Component Date Value Ref Range Status   • Case Report 03/12/2018    Final                    Value:Surgical Pathology Report                         Case: [id number]                                  Authorizing Provider:  Karley Banda MD     Collected:           03/12/2018 08:48 AM          Ordering Location:     [facility]  Received:            03/12/2018 12:43 PM                                 MAIN OR                                                                      Pathologist:           Julio César Hodges MD                                                       Specimen:    Uterus with Cervix, Bilateral Tubes and Ovaries                                           • Final Diagnosis 03/12/2018    Final                     Value:This result contains rich text formatting which cannot be displayed here.   • Gross Description 03/12/2018    Final                    Value:This result contains rich text formatting which cannot be displayed here.   • Microscopic Description 03/12/2018    Final                    Value:This result contains rich text formatting which cannot be displayed here.   • WBC 03/13/2018 17.96* 4.50 - 10.70 10*3/mm3 Final   • RBC 03/13/2018 3.93  3.90 - 5.20 10*6/mm3 Final   • Hemoglobin 03/13/2018 12.0  11.9 - 15.5 g/dL Final   • Hematocrit 03/13/2018 37.9  35.6 - 45.5 % Final   • MCV 03/13/2018 96.4  80.5 - 98.2 fL Final   • MCH 03/13/2018 30.5  26.9 - 32.0 pg Final   • MCHC 03/13/2018 31.7* 32.4 - 36.3 g/dL Final   • RDW 03/13/2018 14.4* 11.7 - 13.0 % Final   • RDW-SD 03/13/2018 50.9  37.0 - 54.0 fl Final   • MPV 03/13/2018 10.5  6.0 - 12.0 fL Final   • Platelets 03/13/2018 248  140 - 500 10*3/mm3 Final   • Glucose 03/13/2018 135* 65 - 99 mg/dL Final   • BUN 03/13/2018 10  8 - 23 mg/dL Final   • Creatinine 03/13/2018 0.80  0.57 - 1.00 mg/dL Final   • Sodium 03/13/2018 139  136 - 145 mmol/L Final   • Potassium 03/13/2018 4.2  3.5 - 5.2 mmol/L Final   • Chloride 03/13/2018 100  98 - 107 mmol/L Final   • CO2 03/13/2018 30.6* 22.0 - 29.0 mmol/L Final   • Calcium 03/13/2018 8.1* 8.6 - 10.5 mg/dL Final   • Total Protein 03/13/2018 6.5  6.0 - 8.5 g/dL Final   • Albumin 03/13/2018 3.30* 3.50 - 5.20 g/dL Final   • ALT (SGPT) 03/13/2018 23  1 - 33 U/L Final   • AST (SGOT) 03/13/2018 16  1 - 32 U/L Final   • Alkaline Phosphatase 03/13/2018 72  39 - 117 U/L Final   • Total Bilirubin 03/13/2018 0.3  0.1 - 1.2 mg/dL Final   • eGFR Non African Amer 03/13/2018 73  >60 mL/min/1.73 Final   • Globulin 03/13/2018 3.2  gm/dL Final   • A/G Ratio 03/13/2018 1.0  g/dL Final   • BUN/Creatinine Ratio 03/13/2018 12.5  7.0 - 25.0 Final   • Anion Gap 03/13/2018 8.4  mmol/L Final   • Color, UA 03/13/2018 Yellow  Yellow, Straw Final   •  Appearance, UA 03/13/2018 Cloudy* Clear Final   • pH, UA 03/13/2018 6.0  5.0 - 8.0 Final   • Specific Gravity, UA 03/13/2018 1.014  1.005 - 1.030 Final   • Glucose, UA 03/13/2018 Negative  Negative Final   • Ketones, UA 03/13/2018 Negative  Negative Final   • Bilirubin, UA 03/13/2018 Negative  Negative Final   • Blood, UA 03/13/2018 Large (3+)* Negative Final   • Protein, UA 03/13/2018 Trace* Negative Final   • Leuk Esterase, UA 03/13/2018 Moderate (2+)* Negative Final   • Nitrite, UA 03/13/2018 Negative  Negative Final   • Urobilinogen, UA 03/13/2018 0.2 E.U./dL  0.2 - 1.0 E.U./dL Final   • RBC, UA 03/13/2018 6-12* None Seen, 0-2 /HPF Final   • WBC, UA 03/13/2018 6-12* None Seen, 0-2 /HPF Final   • Bacteria, UA 03/13/2018 None Seen  None Seen /HPF Final   • Squamous Epithelial Cells, UA 03/13/2018 0-2  None Seen, 0-2 /HPF Final   • Hyaline Casts, UA 03/13/2018 None Seen  None Seen /LPF Final   • Methodology 03/13/2018 Manual Light Microscopy   Final   • Urine Culture 03/13/2018 <10,000 CFU/mL Gram Negative Bacilli*  Final    Call if further workup needed.         ASSESSMENT/ PLAN:    Diagnoses and all orders for this visit:    1. Primary hypertension (Primary)  Assessment & Plan:  Her blood pressure is good here as well as outside the office.  We will continue her current medication.    Orders:  -     Comprehensive Metabolic Panel  -     Lipid Panel    2. Obesity with body mass index 30 or greater  Assessment & Plan:  Her BMI here is 37.02.  Hopefully the transition of some diabetic medicine will help her with some additional weight loss.    Orders:  -     TSH+Free T4    3. Type 2 diabetes mellitus without complication, without long-term current use of insulin (HCC)  Assessment & Plan:  Overall her diabetic control sounds very good.  We will update her A1c.  She does have difficulty tolerating her Metformin as it causes some GI distress and diarrhea.  As result of this we will transition her to some  Trulicity.    Orders:  -     Comprehensive Metabolic Panel  -     Lipid Panel  -     Hemoglobin A1c  -     Microalbumin / Creatinine Urine Ratio - Urine, Clean Catch    4. Malignant neoplasm of left breast in female, estrogen receptor positive, unspecified site of breast (HCC)  Assessment & Plan:  She is doing well she sees her oncologist in [place] on a regular basis.      5. Idiopathic chronic gout of left foot without tophus  Assessment & Plan:  We will update her uric acid level while redoing labs today.    Orders:  -     Uric Acid    6. Restless leg syndrome  Assessment & Plan:  Her restless leg syndrome symptoms are well managed with her Requip nightly.      Other orders  -     Dulaglutide (Trulicity) 0.75 MG/0.5ML solution pen-injector; Inject 0.75 mg under the skin into the appropriate area as directed 1 (One) Time Per Week for 28 days.  Dispense: 4 pen; Refill: 0  -     Dulaglutide (Trulicity) 1.5 MG/0.5ML solution pen-injector; Inject 1.5 mg under the skin into the appropriate area as directed 1 (One) Time Per Week for 28 days.  Dispense: 4 pen; Refill: 4  -     ALPRAZolam (XANAX) 0.5 MG tablet; Take 1 tablet by mouth 2 (Two) Times a Day As Needed for Anxiety for up to 30 days.  Dispense: 30 tablet; Refill: 0      Orders Placed Today:     New Medications Ordered This Visit   Medications   • Dulaglutide (Trulicity) 0.75 MG/0.5ML solution pen-injector     Sig: Inject 0.75 mg under the skin into the appropriate area as directed 1 (One) Time Per Week for 28 days.     Dispense:  4 pen     Refill:  0     Month #1   • Dulaglutide (Trulicity) 1.5 MG/0.5ML solution pen-injector     Sig: Inject 1.5 mg under the skin into the appropriate area as directed 1 (One) Time Per Week for 28 days.     Dispense:  4 pen     Refill:  4     Start month #2   • ALPRAZolam (XANAX) 0.5 MG tablet     Sig: Take 1 tablet by mouth 2 (Two) Times a Day As Needed for Anxiety for up to 30 days.     Dispense:  30 tablet     Refill:  0         Management Plan:     An After Visit Summary was printed and given to the patient at discharge.    Follow-up: No follow-ups on file.    Peter Higgins DO 11/9/2021 10:51 EST  This note was electronically signed.

## 2021-11-09 NOTE — ASSESSMENT & PLAN NOTE
Her blood pressure is good here as well as outside the office.  We will continue her current medication.

## 2021-11-09 NOTE — ASSESSMENT & PLAN NOTE
Overall her diabetic control sounds very good.  We will update her A1c.  She does have difficulty tolerating her Metformin as it causes some GI distress and diarrhea.  As result of this we will transition her to some Trulicity.

## 2021-11-09 NOTE — ASSESSMENT & PLAN NOTE
Her BMI here is 37.02.  Hopefully the transition of some diabetic medicine will help her with some additional weight loss.

## 2021-11-16 ENCOUNTER — TELEPHONE (OUTPATIENT)
Dept: FAMILY MEDICINE CLINIC | Facility: CLINIC | Age: 66
End: 2021-11-16

## 2021-11-16 NOTE — TELEPHONE ENCOUNTER
Caller: Ifeoma Stout    Relationship: Self    Best call back number: [phone]     Caller requesting test results: KEV    What test was performed: A1C    When was the test performed: 11/08/21    Where was the test performed: IN OFFICE    Additional notes: PATIENT WAS CALLING FOR RESULTS

## 2021-11-17 ENCOUNTER — TELEPHONE (OUTPATIENT)
Dept: FAMILY MEDICINE CLINIC | Facility: CLINIC | Age: 66
End: 2021-11-17

## 2021-11-17 NOTE — TELEPHONE ENCOUNTER
Patient called and ask about her   Hemoglobin A1C   4.80 - 5.60 % 6.80 High       She wants you to know she can not explain to you how much better she is feeling. She is sleeping better, she has more energy, the diarrhea has stopped, she is not excessively drinking water now, her snack cravings are gone, she is eating 3 meals a day and still drinking a good amount of water. She is very happy with the care you provided her.

## 2021-11-17 NOTE — TELEPHONE ENCOUNTER
I spoke with patient this morning and gave her the A1C results, also sent in basket to Dr. Higgins.

## 2022-01-13 ENCOUNTER — TELEPHONE (OUTPATIENT)
Dept: FAMILY MEDICINE CLINIC | Facility: CLINIC | Age: 67
End: 2022-01-13

## 2022-01-13 NOTE — TELEPHONE ENCOUNTER
Caller: Ifeoma Stout    Relationship to patient: Self    Best call back number: [phone]     Patient is needing: THE PATIENT HAS CALLED STATING THAT THE MEDICATION TRULICITY WAS COSTING TOO MUCH OUT OF POCKET.  PATIENT THEN STATED METFORMIN UPSET HER STOMACH, SO SHE IS REQUESTING AN ALTERNATIVE SUCH AS GLIPIZIDE.  PATIENT IS SEEKING DR. FERNANDEZ'S ADVICE. PLEASE ADVISE.     [facility] [address] [place] [address], [place] - [address] AT [facility] [address] - [phone]  - [phone]   [phone]

## 2022-01-13 NOTE — TELEPHONE ENCOUNTER
Although the glipizide and medication similar to that are very cheap, these are one of the last preferred medicines to be prescribed.  Ideal medications are first and foremost are metformin followed by either medicines like Trulicity and or medicines like Farxiga.  These are steps 1 2 and 3.  If she has commercial insurance we could always find a co-pay card for her for the Trulicity.

## 2022-01-17 RX ORDER — METFORMIN HYDROCHLORIDE 500 MG/1
500 TABLET, EXTENDED RELEASE ORAL 2 TIMES DAILY WITH MEALS
Qty: 180 TABLET | Refills: 1 | Status: SHIPPED | OUTPATIENT
Start: 2022-01-17 | End: 2022-07-08

## 2022-01-17 NOTE — TELEPHONE ENCOUNTER
Patient called in and states she CAN NOT afford the Trulicity. She has a very high deductible $12,000 and the Trulicity copay card states, deductible must be meet before they will do a $25.00 copay per month.   She looked into Ozempic and Victozia and both are over $900 a month.    She states she might have to suck it up and take the Metformin. She used the last Trulicity sample on 01/13/2021.    Please advise?

## 2022-01-17 NOTE — TELEPHONE ENCOUNTER
I sent in an Rx for metformin extended release.  She should take this either in the middle or the very end of a meal.  Hopefully this will make it more tolerable.  She does tolerate it we can increase it up to 1000 mg twice daily.

## 2022-03-08 ENCOUNTER — OFFICE VISIT (OUTPATIENT)
Dept: FAMILY MEDICINE CLINIC | Facility: CLINIC | Age: 67
End: 2022-03-08

## 2022-03-08 VITALS
BODY MASS INDEX: 36.94 KG/M2 | DIASTOLIC BLOOD PRESSURE: 65 MMHG | SYSTOLIC BLOOD PRESSURE: 148 MMHG | HEIGHT: 70 IN | RESPIRATION RATE: 16 BRPM | WEIGHT: 258 LBS | TEMPERATURE: 98 F | OXYGEN SATURATION: 98 % | HEART RATE: 66 BPM

## 2022-03-08 DIAGNOSIS — E11.9 TYPE 2 DIABETES MELLITUS WITHOUT COMPLICATION, WITHOUT LONG-TERM CURRENT USE OF INSULIN: ICD-10-CM

## 2022-03-08 DIAGNOSIS — I25.10 CORONARY ARTERY DISEASE DUE TO LIPID RICH PLAQUE: Primary | ICD-10-CM

## 2022-03-08 DIAGNOSIS — I10 PRIMARY HYPERTENSION: ICD-10-CM

## 2022-03-08 DIAGNOSIS — E66.9 OBESITY WITH BODY MASS INDEX 30 OR GREATER: ICD-10-CM

## 2022-03-08 DIAGNOSIS — I25.83 CORONARY ARTERY DISEASE DUE TO LIPID RICH PLAQUE: Primary | ICD-10-CM

## 2022-03-08 PROBLEM — E78.49 OTHER HYPERLIPIDEMIA: Status: ACTIVE | Noted: 2022-03-08

## 2022-03-08 LAB
ALBUMIN SERPL-MCNC: 4.2 G/DL (ref 3.5–5.2)
ALBUMIN UR-MCNC: <1.2 MG/DL
ALBUMIN/GLOB SERPL: 1.4 G/DL
ALP SERPL-CCNC: 100 U/L (ref 39–117)
ALT SERPL W P-5'-P-CCNC: 24 U/L (ref 1–33)
ANION GAP SERPL CALCULATED.3IONS-SCNC: 10.4 MMOL/L (ref 5–15)
AST SERPL-CCNC: 22 U/L (ref 1–32)
BILIRUB SERPL-MCNC: 0.3 MG/DL (ref 0–1.2)
BUN SERPL-MCNC: 14 MG/DL (ref 8–23)
BUN/CREAT SERPL: 21.5 (ref 7–25)
CALCIUM SPEC-SCNC: 9.5 MG/DL (ref 8.6–10.5)
CHLORIDE SERPL-SCNC: 102 MMOL/L (ref 98–107)
CHOLEST SERPL-MCNC: 134 MG/DL (ref 0–200)
CO2 SERPL-SCNC: 26.6 MMOL/L (ref 22–29)
CREAT SERPL-MCNC: 0.65 MG/DL (ref 0.57–1)
CREAT UR-MCNC: 129.2 MG/DL
EGFRCR SERPLBLD CKD-EPI 2021: 98.5 ML/MIN/1.73
GLOBULIN UR ELPH-MCNC: 2.9 GM/DL
GLUCOSE SERPL-MCNC: 161 MG/DL (ref 65–99)
HBA1C MFR BLD: 6.7 % (ref 4.8–5.6)
HDLC SERPL-MCNC: 50 MG/DL (ref 40–60)
LDLC SERPL CALC-MCNC: 67 MG/DL (ref 0–100)
LDLC/HDLC SERPL: 1.33 {RATIO}
MICROALBUMIN/CREAT UR: NORMAL MG/G{CREAT}
POTASSIUM SERPL-SCNC: 4 MMOL/L (ref 3.5–5.2)
PROT SERPL-MCNC: 7.1 G/DL (ref 6–8.5)
SODIUM SERPL-SCNC: 139 MMOL/L (ref 136–145)
TRIGL SERPL-MCNC: 87 MG/DL (ref 0–150)
VLDLC SERPL-MCNC: 17 MG/DL (ref 5–40)

## 2022-03-08 PROCEDURE — 80061 LIPID PANEL: CPT | Performed by: FAMILY MEDICINE

## 2022-03-08 PROCEDURE — 36415 COLL VENOUS BLD VENIPUNCTURE: CPT | Performed by: FAMILY MEDICINE

## 2022-03-08 PROCEDURE — 83036 HEMOGLOBIN GLYCOSYLATED A1C: CPT | Performed by: FAMILY MEDICINE

## 2022-03-08 PROCEDURE — 99214 OFFICE O/P EST MOD 30 MIN: CPT | Performed by: FAMILY MEDICINE

## 2022-03-08 PROCEDURE — 82043 UR ALBUMIN QUANTITATIVE: CPT | Performed by: FAMILY MEDICINE

## 2022-03-08 PROCEDURE — 80053 COMPREHEN METABOLIC PANEL: CPT | Performed by: FAMILY MEDICINE

## 2022-03-08 PROCEDURE — 82570 ASSAY OF URINE CREATININE: CPT | Performed by: FAMILY MEDICINE

## 2022-03-08 NOTE — EXTERNAL PATIENT INSTRUCTIONS
Patient Education   Table of Contents       Obesity, Adult     To view videos and all your education online visit,   [email or web address]   or scan this QR code with your smartphone.                  Obesity, Adult     Obesity is having too much body fat. Being obese means that your weight is more than what is healthy for you.   BMI is a number that explains how much body fat you have. If you have a BMI of 30 or more, you are obese. Obesity is often caused by eating or drinking more calories than your body uses. Changing your lifestyle can help you lose weight.    Obesity can cause serious health problems, such as:       Stroke.       Coronary artery disease (CAD).       Type 2 diabetes.       Some types of cancer, including cancers of the colon, breast, uterus, and gallbladder.       Osteoarthritis.       High blood pressure (hypertension).       High cholesterol.       Sleep apnea.       Gallbladder stones.       Infertility problems.     What are the causes?         Eating meals each day that are high in calories, sugar, and fat.       Being born with genes that may make you more likely to become obese.       Having a medical condition that causes obesity.       Taking certain medicines.       Sitting a lot (having a sedentary lifestyle).       Not getting enough sleep.       Drinking a lot of drinks that have sugar in them.     What increases the risk?         Having a family history of obesity.       Being an  woman.       Being a  man.      Living in an area with limited access to:       [place], recreation centers, or sidewalks.       Healthy food choices, such as grocery stores and [employer] markets.     What are the signs or symptoms?   The main sign is having too much body fat.   How is this treated?        Treatment for this condition often includes changing your lifestyle. Treatment may include:       Changing your diet. This may include making a healthy meal plan.        Exercise. This may include activity that causes your heart to beat faster (aerobic exercise) and strength training. Work with your doctor to design a program that works for you.       Medicine to help you lose weight. This may be used if you are not able to lose 1 pound a week after 6 weeks of healthy eating and more exercise.       Treating conditions that cause the obesity.      Surgery. Options may include gastric banding and gastric bypass. This may be done if:       Other treatments have not helped to improve your condition.       You have a BMI of 40 or higher.       You have life-threatening health problems related to obesity.         Follow these instructions at home:   Eating and drinking           Follow advice from your doctor about what to eat and drink. Your doctor may tell you to:       Limit fast food, sweets, and processed snack foods.       Choose low-fat options. For example, choose low-fat milk instead of whole milk.       Eat 5 or more servings of fruits or vegetables each day.       Eat at home more often. This gives you more control over what you eat.       Choose healthy foods when you eat out.       Learn to read food labels. This will help you learn how much food is in 1 serving.       Keep low-fat snacks available.       Avoid drinks that have a lot of sugar in them. These include soda, fruit juice, iced tea with sugar, and flavored milk.       Drink enough water to keep your pee (urine) pale yellow.      Do not  go on fad diets.     Physical activity        Exercise often, as told by your doctor. Most adults should get up to 150 minutes of moderate-intensity exercise every week.Ask your doctor:       What types of exercise are safe for you.       How often you should exercise.       Warm up and stretch before being active.       Do slow stretching after being active (cool down).       Rest between times of being active.     Lifestyle         Work with your doctor and a food expert  (dietitian) to set a weight-loss goal that is best for you.       Limit your screen time.       Find ways to reward yourself that do not involve food.      Do not  drink alcohol if:       Your doctor tells you not to drink.       You are pregnant, may be pregnant, or are planning to become pregnant.      If you drink alcohol:      Limit how much you use to:       0?1 drink a day for women.       0?2 drinks a day for men.       Be aware of how much alcohol is in your drink. In the [place] one drink equals one 12 oz bottle of beer (355 mL), one 5 oz glass of wine (148 mL), or one 1? oz glass of hard liquor (44 mL).     General instructions        Keep a weight-loss journal. This can help you keep track of:       The food that you eat.       How much exercise you get.       Take over-the-counter and prescription medicines only as told by your doctor.       Take vitamins and supplements only as told by your doctor.       Think about joining a support group.       Keep all follow-up visits as told by your doctor. This is important.       Contact a doctor if:         You cannot meet your weight loss goal after you have changed your diet and lifestyle for 6 weeks.     Get help right away if you:         Are having trouble breathing.       Are having thoughts of harming yourself.     Summary         Obesity is having too much body fat.       Being obese means that your weight is more than what is healthy for you.       Work with your doctor to set a weight-loss goal.       Get regular exercise as told by your doctor.     This information is not intended to replace advice given to you by your health care provider. Make sure you discuss any questions you have with your health care provider.     Document Released: 03/11/2013Document Revised: 08/22/2019Document Reviewed: 08/22/2019     [employer] Patient Education ? 2022 [employer]

## 2022-03-08 NOTE — ASSESSMENT & PLAN NOTE
Her home blood pressure readings are much better than it used to here today.  We will continue her current meds.

## 2022-03-08 NOTE — PROGRESS NOTES
Chief Complaint   Patient presents with   • Follow-up     4 mo f/u        Subjective     Ifeoma Stout  has a past medical history of Colon cancer screening (05/02/2019), Diabetes mellitus (HCC) (7/20/2020), History of chemotherapy, History of staph infection, Hypertension, Migraine, Post-menopausal bleeding, and Uterine fibroid.    Type 2 diabetes-she checks her blood sugars most every morning.  They are typically in the 110s most mornings.  She states when she took the Trulicity she states she felt much better at that time unfortunately her insurance company did not have good coverage and was very expensive.    Hypertension-she does check her blood pressure at home on a weekly basis.  She states is typically in the 130s over 50s and 60s.    Hyperlipidemia-she takes her pravastatin on a nightly basis.    CAD-she denies any chest pain tightness or heaviness.  She denies any unexplained exertional fatigue or shortness of breath.      PHQ-2 Depression Screening  Little interest or pleasure in doing things?     Feeling down, depressed, or hopeless?     PHQ-2 Total Score     PHQ-9 Depression Screening  Little interest or pleasure in doing things?     Feeling down, depressed, or hopeless?     Trouble falling or staying asleep, or sleeping too much?     Feeling tired or having little energy?     Poor appetite or overeating?     Feeling bad about yourself - or that you are a failure or have let yourself or your family down?     Trouble concentrating on things, such as reading the newspaper or watching television?     Moving or speaking so slowly that other people could have noticed? Or the opposite - being so fidgety or restless that you have been moving around a lot more than usual?     Thoughts that you would be better off dead, or of hurting yourself in some way?     PHQ-9 Total Score     If you checked off any problems, how difficult have these problems made it for you to do your work, take care of things at home, or  get along with other people?       Allergies   Allergen Reactions   • Dapagliflozin Rash     Other reaction(s): Rash     • Adhesive Tape Rash       Prior to Admission medications    Medication Sig Start Date End Date Taking? Authorizing Provider   allopurinol (ZYLOPRIM) 300 MG tablet Take 1 tablet by mouth Daily. 11/3/21  Yes Jeimy Mathis MD   aspirin 81 MG chewable tablet Chew 81 mg Daily.   Yes Jeimy Mathis MD   cetirizine (zyrTEC) 10 MG tablet Take 10 mg by mouth Daily.   Yes Jeimy Mathis MD   Cholecalciferol (VITAMIN D-3) 1000 units capsule Take 1 Units by mouth Daily.   Yes Jeimy Mathis MD   lisinopril-hydrochlorothiazide (PRINZIDE,ZESTORETIC) 10-12.5 MG per tablet Take 1 tablet by mouth Daily. 7/23/21  Yes Jeimy Mathis MD   metFORMIN ER (GLUCOPHAGE-XR) 500 MG 24 hr tablet Take 1 tablet by mouth 2 (Two) Times a Day With Meals for 90 days. 1/17/22 4/17/22 Yes Peetr Higgins,    metoprolol succinate XL (TOPROL-XL) 100 MG 24 hr tablet Take 1 tablet by mouth Daily. 10/12/21  Yes Jeimy Mathis MD   Multiple Vitamins-Minerals (MULTIVITAMIN ADULTS PO) Take 1 tablet/day by mouth Daily.   Yes Jeimy Mathis MD   pantoprazole (PROTONIX) 40 MG EC tablet Take 40 mg by mouth Every Morning.   Yes Jeimy Mathis MD   pravastatin (PRAVACHOL) 20 MG tablet Take 1 tablet by mouth Daily. 11/5/21  Yes Jeimy Mathis MD   rOPINIRole (REQUIP) 0.5 MG tablet Take 1 tablet by mouth Daily. 11/3/21  Yes Jeimy Mathis MD        Patient Active Problem List   Diagnosis   • Uterine leiomyoma   • Acquired absence of bilateral breasts and nipples   • Anxiety   • Malignant neoplasm of breast (HCC)   • Abnormal cardiovascular stress test   • Coronary artery disease due to lipid rich plaque   • Depressive disorder   • Dysfunctional uterine bleeding   • Electrocardiogram abnormal   • Esophageal dysmotility   • Hypertension   • Estrogen receptor positive  tumor status   • Primary gout   • History of gastroesophageal reflux (GERD)   • Menopause   • Migraine   • Obesity with body mass index 30 or greater   • Restless leg syndrome   • Sleep pattern disturbance   • Diabetes mellitus (HCC)   • Hallux valgus   • Other hyperlipidemia        Past Surgical History:   Procedure Laterality Date   • DILATATION AND CURETTAGE      X2   • DILATATION AND CURETTAGE N/A 1/29/2018    Procedure: DILATATION AND CURETTAGE;  Surgeon: Moriah Schmitz MD;  Location: [facility];  Service:    • ENDOMETRIAL ABLATION     • MASTECTOMY      WITH BREAST RECONSTRUCITON   • MASTECTOMY Bilateral    • TOTAL LAPAROSCOPIC HYSTERECTOMY Bilateral 3/12/2018    Procedure: TOTAL HYSTERECTOMY BILATERAL SALPINGECTOMY BILATERAL SALPINGO-OOPHORECTOMY DA CHERY ROBOT;  Surgeon: Karley Banda MD;  Location: [facility];  Service: [facility]       Social History     Socioeconomic History   • Marital status:    Tobacco Use   • Smoking status: Never Smoker   • Smokeless tobacco: Never Used   Vaping Use   • Vaping Use: Never used   Substance and Sexual Activity   • Alcohol use: Yes     Alcohol/week: 1.0 standard drink     Types: 1 Glasses of wine per week     Comment: WEEK   • Drug use: No   • Sexual activity: Defer       Family History   Problem Relation Age of Onset   • Colon cancer Sister    • Heart disease Father    • Malig Hyperthermia Neg Hx        Family history, surgical history, past medical history, Allergies and med's reviewed with patient today and updated in [facility] E[facility].     ROS:  Review of Systems   Constitutional: Negative for chills, fatigue and fever.   HENT: Negative for congestion, postnasal drip and rhinorrhea.    Eyes: Negative for blurred vision and visual disturbance.   Respiratory: Negative for cough, chest tightness, shortness of breath and wheezing.    Cardiovascular: Negative for chest pain and palpitations.   Gastrointestinal: Negative for abdominal pain, constipation and  "diarrhea.   Endocrine: Negative for polydipsia and polyuria.   Allergic/Immunologic: Positive for environmental allergies.   Neurological: Negative for headache.   Psychiatric/Behavioral: Negative for depressed mood. The patient is not nervous/anxious.        OBJECTIVE:  Vitals:    03/08/22 1135   BP: 148/65   BP Location: Right arm   Patient Position: Sitting   Cuff Size: Large Adult   Pulse: 66   Resp: 16   Temp: 98 °F (36.7 °C)   TempSrc: Temporal   SpO2: 98%   Weight: 117 kg (258 lb)   Height: 177.8 cm (70\")     No exam data present   Body mass index is 37.02 kg/m².  No LMP recorded. Patient is postmenopausal.    Physical Exam  Vitals and nursing note reviewed.   Constitutional:       General: She is not in acute distress.     Appearance: Normal appearance. She is obese.   HENT:      Head: Normocephalic.      Right Ear: Tympanic membrane, ear canal and external ear normal.      Left Ear: Tympanic membrane, ear canal and external ear normal.      Nose: Nose normal.      Mouth/Throat:      Mouth: Mucous membranes are moist.      Pharynx: Oropharynx is clear.   Eyes:      General: No scleral icterus.     Conjunctiva/sclera: Conjunctivae normal.      Pupils: Pupils are equal, round, and reactive to light.   Cardiovascular:      Rate and Rhythm: Normal rate and regular rhythm.      Pulses: Normal pulses.      Heart sounds: Normal heart sounds. No murmur heard.  Pulmonary:      Effort: Pulmonary effort is normal.      Breath sounds: Normal breath sounds. No wheezing, rhonchi or rales.   Musculoskeletal:      Cervical back: No rigidity or tenderness.   Lymphadenopathy:      Cervical: No cervical adenopathy.   Skin:     General: Skin is warm and dry.      Coloration: Skin is not jaundiced.      Findings: No rash.   Neurological:      General: No focal deficit present.      Mental Status: She is alert and oriented to person, place, and time.   Psychiatric:         Mood and Affect: Mood normal.         Thought Content: " Thought content normal.         Judgment: Judgment normal.         Procedures    No visits with results within 30 Day(s) from this visit.   Latest known visit with results is:   Office Visit on 11/09/2021   Component Date Value Ref Range Status   • Glucose 11/09/2021 153 (A) 65 - 99 mg/dL Final   • BUN 11/09/2021 12  8 - 23 mg/dL Final   • Creatinine 11/09/2021 0.70  0.57 - 1.00 mg/dL Final   • Sodium 11/09/2021 137  136 - 145 mmol/L Final   • Potassium 11/09/2021 4.2  3.5 - 5.2 mmol/L Final   • Chloride 11/09/2021 98  98 - 107 mmol/L Final   • CO2 11/09/2021 28.0  22.0 - 29.0 mmol/L Final   • Calcium 11/09/2021 9.4  8.6 - 10.5 mg/dL Final   • Total Protein 11/09/2021 7.3  6.0 - 8.5 g/dL Final   • Albumin 11/09/2021 4.10  3.50 - 5.20 g/dL Final   • ALT (SGPT) 11/09/2021 23  1 - 33 U/L Final   • AST (SGOT) 11/09/2021 19  1 - 32 U/L Final   • Alkaline Phosphatase 11/09/2021 112  39 - 117 U/L Final   • Total Bilirubin 11/09/2021 0.4  0.0 - 1.2 mg/dL Final   • eGFR Non African Amer 11/09/2021 85  >60 mL/min/1.73 Final   • Globulin 11/09/2021 3.2  gm/dL Final   • A/G Ratio 11/09/2021 1.3  g/dL Final   • BUN/Creatinine Ratio 11/09/2021 17.1  7.0 - 25.0 Final   • Anion Gap 11/09/2021 11.0  5.0 - 15.0 mmol/L Final   • Total Cholesterol 11/09/2021 139  0 - 200 mg/dL Final   • Triglycerides 11/09/2021 100  0 - 150 mg/dL Final   • HDL Cholesterol 11/09/2021 50  40 - 60 mg/dL Final   • LDL Cholesterol  11/09/2021 70  0 - 100 mg/dL Final   • VLDL Cholesterol 11/09/2021 19  5 - 40 mg/dL Final   • LDL/HDL Ratio 11/09/2021 1.38   Final   • Hemoglobin A1C 11/09/2021 6.80 (A) 4.80 - 5.60 % Final   • Microalbumin/Creatinine Ratio 11/09/2021    Final    Unable to calculate   • Creatinine, Urine 11/09/2021 116.4  mg/dL Final   • Microalbumin, Urine 11/09/2021 <1.2  mg/dL Final   • TSH 11/09/2021 1.890  0.270 - 4.200 uIU/mL Final   • Free T4 11/09/2021 1.35  0.93 - 1.70 ng/dL Final    T4 results may be falsely increased if patient taking  Biotin.   • Uric Acid 11/09/2021 4.6  2.4 - 5.7 mg/dL Final       ASSESSMENT/ PLAN:    Diagnoses and all orders for this visit:    1. Coronary artery disease due to lipid rich plaque (Primary)  Assessment & Plan:  She is doing well and denies any current anginal-like symptoms.      2. Primary hypertension  Assessment & Plan:  Her home blood pressure readings are much better than it used to here today.  We will continue her current meds.      3. Obesity with body mass index 30 or greater  Assessment & Plan:  Her BMI is at 37.02.  She needs to work on lifestyle changes of diet exercise and weight loss.      4. Type 2 diabetes mellitus without complication, without long-term current use of insulin (HCC)  Assessment & Plan:  Her overall well blood sugars sound very good.  We will update her A1c.        Orders Placed Today:     No orders of the defined types were placed in this encounter.       Management Plan:     An After Visit Summary was printed and given to the patient at discharge.    Follow-up: Return in about 4 months (around 7/8/2022).    Peter Higgins DO 3/8/2022 12:13 EST  This note was electronically signed.

## 2022-05-02 RX ORDER — ALPRAZOLAM 0.5 MG/1
TABLET ORAL
Qty: 30 TABLET | Refills: 0 | Status: SHIPPED | OUTPATIENT
Start: 2022-05-02

## 2022-06-07 RX ORDER — ALLOPURINOL 300 MG/1
300 TABLET ORAL DAILY
Qty: 90 TABLET | Refills: 3 | Status: SHIPPED | OUTPATIENT
Start: 2022-06-07

## 2022-06-07 NOTE — TELEPHONE ENCOUNTER
Caller: Ifeoma Stout    Relationship: Self    Best call back number: [phone]    Requested Prescriptions:   Requested Prescriptions     Pending Prescriptions Disp Refills   • allopurinol (ZYLOPRIM) 300 MG tablet       Sig: Take 1 tablet by mouth Daily.      PANTOPRAZOLE SODIUM DR 40 MG     Pharmacy where request should be sent: [id number], [place] - [address] AT [facility] [address]  [phone] [facility] [phone] FX     Additional details provided by patient: PATIENT STATED SHE IS NEEDING THESE TO BE REFILLED PLEASE ADVISE THANK YOU.     Does the patient have less than a 3 day supply:  [x] Yes  [] No    Jacob Lezama Rep   06/07/22 13:54 EDT

## 2022-07-06 ENCOUNTER — TELEPHONE (OUTPATIENT)
Dept: FAMILY MEDICINE CLINIC | Facility: CLINIC | Age: 67
End: 2022-07-06

## 2022-07-06 NOTE — TELEPHONE ENCOUNTER
Caller: Ifeoma Stout    Relationship: Self    Best call back number: [phone]    What is the medical concern/diagnosis: SHOULDER / NECK DISALIGNMENT    What specialty or service is being requested: CHIROPRACTOR     What is the provider, practice or medical service name: DR YAMIL KELLOGG [facility]IR[facility]RACTIC    What is the office location: [address] [place] [address] [address]A ASHER    What is the office phone number: [phone]

## 2022-07-08 ENCOUNTER — OFFICE VISIT (OUTPATIENT)
Dept: FAMILY MEDICINE CLINIC | Facility: CLINIC | Age: 67
End: 2022-07-08

## 2022-07-08 VITALS
HEIGHT: 70 IN | TEMPERATURE: 97.1 F | WEIGHT: 258.6 LBS | BODY MASS INDEX: 37.02 KG/M2 | HEART RATE: 68 BPM | DIASTOLIC BLOOD PRESSURE: 63 MMHG | SYSTOLIC BLOOD PRESSURE: 112 MMHG | OXYGEN SATURATION: 96 %

## 2022-07-08 DIAGNOSIS — E11.9 TYPE 2 DIABETES MELLITUS WITHOUT COMPLICATION, WITHOUT LONG-TERM CURRENT USE OF INSULIN: ICD-10-CM

## 2022-07-08 DIAGNOSIS — I10 PRIMARY HYPERTENSION: Primary | ICD-10-CM

## 2022-07-08 DIAGNOSIS — E78.49 OTHER HYPERLIPIDEMIA: ICD-10-CM

## 2022-07-08 LAB
ANION GAP SERPL CALCULATED.3IONS-SCNC: 11.3 MMOL/L (ref 5–15)
BUN SERPL-MCNC: 11 MG/DL (ref 8–23)
BUN/CREAT SERPL: 14.9 (ref 7–25)
CALCIUM SPEC-SCNC: 9.4 MG/DL (ref 8.6–10.5)
CHLORIDE SERPL-SCNC: 98 MMOL/L (ref 98–107)
CO2 SERPL-SCNC: 28.7 MMOL/L (ref 22–29)
CREAT SERPL-MCNC: 0.74 MG/DL (ref 0.57–1)
EGFRCR SERPLBLD CKD-EPI 2021: 90.5 ML/MIN/1.73
GLUCOSE SERPL-MCNC: 175 MG/DL (ref 65–99)
HBA1C MFR BLD: 7 % (ref 4.8–5.6)
POTASSIUM SERPL-SCNC: 4.1 MMOL/L (ref 3.5–5.2)
SODIUM SERPL-SCNC: 138 MMOL/L (ref 136–145)

## 2022-07-08 PROCEDURE — 83036 HEMOGLOBIN GLYCOSYLATED A1C: CPT | Performed by: FAMILY MEDICINE

## 2022-07-08 PROCEDURE — 80048 BASIC METABOLIC PNL TOTAL CA: CPT | Performed by: FAMILY MEDICINE

## 2022-07-08 PROCEDURE — 36415 COLL VENOUS BLD VENIPUNCTURE: CPT | Performed by: FAMILY MEDICINE

## 2022-07-08 PROCEDURE — 99214 OFFICE O/P EST MOD 30 MIN: CPT | Performed by: FAMILY MEDICINE

## 2022-07-08 RX ORDER — PRAVASTATIN SODIUM 20 MG
20 TABLET ORAL DAILY
COMMUNITY
Start: 2022-04-08 | End: 2022-07-08 | Stop reason: SDUPTHER

## 2022-07-08 RX ORDER — ORAL SEMAGLUTIDE 3 MG/1
3 TABLET ORAL DAILY
Qty: 90 TABLET | Refills: 0 | COMMUNITY
Start: 2022-07-08 | End: 2022-11-08

## 2022-07-08 NOTE — PROGRESS NOTES
Chief Complaint   Patient presents with   • Follow-up     4 month    • Hypertension   • Hyperlipidemia        Subjective     Ifeoma Stout  has a past medical history of Colon cancer screening (05/02/2019), Diabetes mellitus (HCC) (7/20/2020), History of chemotherapy, History of staph infection, Hypertension, Migraine, Post-menopausal bleeding, and Uterine fibroid.    Type 2 diabetes- she does check her blood sugar on a regular basis.  She states it typically ranges from 114-140.  She takes her metformin most days, but when she does it does cause explosive diarrhea.  She has tried taking this at different times a day and always with meals.  But unfortunately it has not made any difference.  We have tried to prescribe alternative therapy, Trulicity, but her co-pay is $600 a month.    Hypertension- she does check her blood pressure at home.  It has been great at home such as 118/70.  It is very good here today at 112/63.    Hyperlipidemia- she takes her pravastatin on a daily basis.      PHQ-2 Depression Screening  Little interest or pleasure in doing things?     Feeling down, depressed, or hopeless?     PHQ-2 Total Score     PHQ-9 Depression Screening  Little interest or pleasure in doing things?     Feeling down, depressed, or hopeless?     Trouble falling or staying asleep, or sleeping too much?     Feeling tired or having little energy?     Poor appetite or overeating?     Feeling bad about yourself - or that you are a failure or have let yourself or your family down?     Trouble concentrating on things, such as reading the newspaper or watching television?     Moving or speaking so slowly that other people could have noticed? Or the opposite - being so fidgety or restless that you have been moving around a lot more than usual?     Thoughts that you would be better off dead, or of hurting yourself in some way?     PHQ-9 Total Score     If you checked off any problems, how difficult have these problems made it for  you to do your work, take care of things at home, or get along with other people?       Allergies   Allergen Reactions   • Dapagliflozin Rash     Other reaction(s): Rash     • Adhesive Tape Rash       Prior to Admission medications    Medication Sig Start Date End Date Taking? Authorizing Provider   allopurinol (ZYLOPRIM) 300 MG tablet Take 1 tablet by mouth Daily. 6/7/22  Yes Peter Higgins DO   ALPRAZolam (XANAX) 0.5 MG tablet TAKE ONE TABLET BY MOUTH TWICE A DAY AS NEEDED FOR ANXIETY. FOR UP TO 30 DAYS 5/2/22  Yes Peter Higgins, DO   aspirin 81 MG chewable tablet Chew 81 mg Daily.   Yes Jeimy Mathis MD   cetirizine (zyrTEC) 10 MG tablet Take 10 mg by mouth Daily.   Yes Jeimy Mathis MD   Cholecalciferol (VITAMIN D-3) 1000 units capsule Take 1 Units by mouth Daily.   Yes Jeimy Mathis MD   lisinopril-hydrochlorothiazide (PRINZIDE,ZESTORETIC) 10-12.5 MG per tablet Take 1 tablet by mouth Daily. 7/23/21  Yes Jeimy Mathis MD   metFORMIN ER (GLUCOPHAGE-XR) 500 MG 24 hr tablet Take 1 tablet by mouth 2 (Two) Times a Day With Meals for 90 days. 1/17/22 4/17/22 Yes Peter Higgins, DO   metoprolol succinate XL (TOPROL-XL) 100 MG 24 hr tablet Take 1 tablet by mouth Daily. 10/12/21  Yes Jeimy Mathis MD   Multiple Vitamins-Minerals (MULTIVITAMIN ADULTS PO) Take 1 tablet/day by mouth Daily.   Yes Jeimy Mathis MD   pantoprazole (PROTONIX) 40 MG EC tablet Take 40 mg by mouth Every Morning.   Yes Jeimy Mathis MD   pravastatin (PRAVACHOL) 20 MG tablet Take 1 tablet by mouth Daily. 11/5/21  Yes eJimy Mathis MD   rOPINIRole (REQUIP) 0.5 MG tablet Take 1 tablet by mouth Daily. 11/3/21  Yes Jeimy Mathis MD   pravastatin (PRAVACHOL) 20 MG tablet Take 20 mg by mouth Daily. 4/8/22 7/8/22  Jeimy Mathis MD        Patient Active Problem List   Diagnosis   • Uterine leiomyoma   • Acquired absence of bilateral breasts and  nipples   • Anxiety   • Malignant neoplasm of breast (HCC)   • Abnormal cardiovascular stress test   • Coronary artery disease due to lipid rich plaque   • Depressive disorder   • Dysfunctional uterine bleeding   • Electrocardiogram abnormal   • Esophageal dysmotility   • Hypertension   • Estrogen receptor positive tumor status   • Primary gout   • History of gastroesophageal reflux (GERD)   • Menopause   • Migraine   • Obesity with body mass index 30 or greater   • Restless leg syndrome   • Sleep pattern disturbance   • Diabetes mellitus (HCC)   • Hallux valgus   • Other hyperlipidemia        Past Surgical History:   Procedure Laterality Date   • DILATATION AND CURETTAGE      X2   • DILATATION AND CURETTAGE N/A 1/29/2018    Procedure: DILATATION AND CURETTAGE;  Surgeon: Moriah Schmitz MD;  Location: [facility];  Service:    • ENDOMETRIAL ABLATION     • MASTECTOMY      WITH BREAST RECONSTRUCITON   • MASTECTOMY Bilateral    • TOTAL LAPAROSCOPIC HYSTERECTOMY Bilateral 3/12/2018    Procedure: TOTAL HYSTERECTOMY BILATERAL SALPINGECTOMY BILATERAL SALPINGO-OOPHORECTOMY DA CHERY ROBOT;  Surgeon: Karley Banda MD;  Location: [facility];  Service: [facility]       Social History     Socioeconomic History   • Marital status:    Tobacco Use   • Smoking status: Never Smoker   • Smokeless tobacco: Never Used   Vaping Use   • Vaping Use: Never used   Substance and Sexual Activity   • Alcohol use: Yes     Alcohol/week: 1.0 standard drink     Types: 1 Glasses of wine per week     Comment: WEEK   • Drug use: No   • Sexual activity: Defer       Family History   Problem Relation Age of Onset   • Colon cancer Sister    • Heart disease Father    • Malig Hyperthermia Neg Hx        Family history, surgical history, past medical history, Allergies and meds reviewed with patient today and updated in [facility] EMR.     ROS:  Review of Systems   Constitutional: Negative for fatigue.   HENT: Positive for congestion, postnasal  "drip and rhinorrhea.    Eyes: Negative for blurred vision and visual disturbance.   Respiratory: Negative for cough, chest tightness, shortness of breath and wheezing.    Cardiovascular: Negative for chest pain and palpitations.   Endocrine: Negative for polydipsia and polyuria.   Allergic/Immunologic: Positive for environmental allergies.   Neurological: Negative for headache.   Psychiatric/Behavioral: Negative for depressed mood. The patient is not nervous/anxious.        OBJECTIVE:  Vitals:    07/08/22 0955   BP: 112/63   BP Location: Right arm   Patient Position: Sitting   Pulse: 68   Temp: 97.1 °F (36.2 °C)   SpO2: 96%   Weight: 117 kg (258 lb 9.6 oz)   Height: 177.8 cm (70\")     No exam data present   Body mass index is 37.11 kg/m².  No LMP recorded. Patient is postmenopausal.    Physical Exam  Vitals and nursing note reviewed.   Constitutional:       General: She is not in acute distress.     Appearance: Normal appearance. She is obese.   HENT:      Head: Normocephalic.      Right Ear: Tympanic membrane, ear canal and external ear normal.      Left Ear: Tympanic membrane, ear canal and external ear normal.      Nose: Nose normal.      Mouth/Throat:      Mouth: Mucous membranes are moist.      Pharynx: Oropharynx is clear.   Eyes:      General: No scleral icterus.     Conjunctiva/sclera: Conjunctivae normal.      Pupils: Pupils are equal, round, and reactive to light.   Cardiovascular:      Rate and Rhythm: Normal rate and regular rhythm.      Pulses: Normal pulses.      Heart sounds: Normal heart sounds. No murmur heard.  Pulmonary:      Effort: Pulmonary effort is normal.      Breath sounds: Normal breath sounds. No wheezing, rhonchi or rales.   Musculoskeletal:      Cervical back: Neck supple. No rigidity or tenderness.   Lymphadenopathy:      Cervical: No cervical adenopathy.   Skin:     General: Skin is warm and dry.      Coloration: Skin is not jaundiced.      Findings: No rash.   Neurological:      " General: No focal deficit present.      Mental Status: She is alert and oriented to person, place, and time.   Psychiatric:         Mood and Affect: Mood normal.         Thought Content: Thought content normal.         Judgment: Judgment normal.         Procedures    No visits with results within 30 Day(s) from this visit.   Latest known visit with results is:   Office Visit on 03/08/2022   Component Date Value Ref Range Status   • Glucose 03/08/2022 161 (A) 65 - 99 mg/dL Final   • BUN 03/08/2022 14  8 - 23 mg/dL Final   • Creatinine 03/08/2022 0.65  0.57 - 1.00 mg/dL Final   • Sodium 03/08/2022 139  136 - 145 mmol/L Final   • Potassium 03/08/2022 4.0  3.5 - 5.2 mmol/L Final   • Chloride 03/08/2022 102  98 - 107 mmol/L Final   • CO2 03/08/2022 26.6  22.0 - 29.0 mmol/L Final   • Calcium 03/08/2022 9.5  8.6 - 10.5 mg/dL Final   • Total Protein 03/08/2022 7.1  6.0 - 8.5 g/dL Final   • Albumin 03/08/2022 4.20  3.50 - 5.20 g/dL Final   • ALT (SGPT) 03/08/2022 24  1 - 33 U/L Final   • AST (SGOT) 03/08/2022 22  1 - 32 U/L Final   • Alkaline Phosphatase 03/08/2022 100  39 - 117 U/L Final   • Total Bilirubin 03/08/2022 0.3  0.0 - 1.2 mg/dL Final   • Globulin 03/08/2022 2.9  gm/dL Final   • A/G Ratio 03/08/2022 1.4  g/dL Final   • BUN/Creatinine Ratio 03/08/2022 21.5  7.0 - 25.0 Final   • Anion Gap 03/08/2022 10.4  5.0 - 15.0 mmol/L Final   • eGFR 03/08/2022 98.5  >60.0 mL/min/1.73 Final    National Kidney Foundation and American Society of Nephrology (ASN) Task Force recommended calculation based on the Chronic Kidney Disease Epidemiology Collaboration (CKD-EPI) equation refit without adjustment for race.   • Total Cholesterol 03/08/2022 134  0 - 200 mg/dL Final   • Triglycerides 03/08/2022 87  0 - 150 mg/dL Final   • HDL Cholesterol 03/08/2022 50  40 - 60 mg/dL Final   • LDL Cholesterol  03/08/2022 67  0 - 100 mg/dL Final   • VLDL Cholesterol 03/08/2022 17  5 - 40 mg/dL Final   • LDL/HDL Ratio 03/08/2022 1.33   Final   •  Hemoglobin A1C 03/08/2022 6.70 (A) 4.80 - 5.60 % Final   • Microalbumin/Creatinine Ratio 03/08/2022    Final    Unable to calculate   • Creatinine, Urine 03/08/2022 129.2  mg/dL Final   • Microalbumin, Urine 03/08/2022 <1.2  mg/dL Final       ASSESSMENT/ PLAN:    Diagnoses and all orders for this visit:    1. Primary hypertension (Primary)  Assessment & Plan:  Her blood pressure is great here as well as outside the office.  We will continue her current meds and update her labs.    Orders:  -     Basic Metabolic Panel    2. Other hyperlipidemia  Assessment & Plan:  Her most recent lipid profile was good.  We will skip that this visit.      3. Type 2 diabetes mellitus without complication, without long-term current use of insulin (HCC)  Assessment & Plan:  We will update her A1c here today.  We will see if we can find an alternative medication instead of the metformin.    Orders:  -     Semaglutide (Rybelsus) 3 MG tablet; Take 1 tablet by mouth Daily. For 1 month then increase to 2 tabs daily.  Dispense: 90 tablet; Refill: 0  -     Hemoglobin A1c      Orders Placed Today:     New Medications Ordered This Visit   Medications   • Semaglutide (Rybelsus) 3 MG tablet     Sig: Take 1 tablet by mouth Daily. For 1 month then increase to 2 tabs daily.     Dispense:  90 tablet     Refill:  0        Management Plan:     An After Visit Summary was printed and given to the patient at discharge.    Follow-up: Return in about 4 months (around 11/8/2022) for Recheck.    Peter Higgins DO 7/8/2022 10:51 EDT  This note was electronically signed.

## 2022-07-08 NOTE — ASSESSMENT & PLAN NOTE
We will update her A1c here today.  We will see if we can find an alternative medication instead of the metformin.

## 2022-07-08 NOTE — ASSESSMENT & PLAN NOTE
Her blood pressure is great here as well as outside the office.  We will continue her current meds and update her labs.

## 2022-08-11 ENCOUNTER — TELEPHONE (OUTPATIENT)
Dept: FAMILY MEDICINE CLINIC | Facility: CLINIC | Age: 67
End: 2022-08-11

## 2022-08-11 RX ORDER — ONDANSETRON 4 MG/1
4 TABLET, ORALLY DISINTEGRATING ORAL EVERY 8 HOURS PRN
Qty: 10 TABLET | Refills: 0 | Status: SHIPPED | OUTPATIENT
Start: 2022-08-11 | End: 2022-10-12

## 2022-08-11 NOTE — TELEPHONE ENCOUNTER
I sent in some Zofran.  Hopefully this will help her get through her transition.  If her nausea persists we may need to consider other therapy.

## 2022-08-11 NOTE — TELEPHONE ENCOUNTER
Pt states that you told her if she is experiencing nausea with the Rebelsus  that you would call her in something to the pharmacy , please advise

## 2022-10-03 ENCOUNTER — TELEPHONE (OUTPATIENT)
Dept: FAMILY MEDICINE CLINIC | Facility: CLINIC | Age: 67
End: 2022-10-03

## 2022-10-12 RX ORDER — ONDANSETRON 4 MG/1
TABLET, ORALLY DISINTEGRATING ORAL
Qty: 10 TABLET | Refills: 0 | Status: SHIPPED | OUTPATIENT
Start: 2022-10-12

## 2022-11-08 ENCOUNTER — OFFICE VISIT (OUTPATIENT)
Dept: FAMILY MEDICINE CLINIC | Facility: CLINIC | Age: 67
End: 2022-11-08

## 2022-11-08 VITALS
HEIGHT: 70 IN | OXYGEN SATURATION: 99 % | BODY MASS INDEX: 34.22 KG/M2 | TEMPERATURE: 98.2 F | HEART RATE: 73 BPM | WEIGHT: 239 LBS | SYSTOLIC BLOOD PRESSURE: 120 MMHG | DIASTOLIC BLOOD PRESSURE: 60 MMHG

## 2022-11-08 DIAGNOSIS — E11.9 TYPE 2 DIABETES MELLITUS WITHOUT COMPLICATION, WITHOUT LONG-TERM CURRENT USE OF INSULIN: ICD-10-CM

## 2022-11-08 DIAGNOSIS — I10 PRIMARY HYPERTENSION: ICD-10-CM

## 2022-11-08 DIAGNOSIS — I25.10 CORONARY ARTERY DISEASE DUE TO LIPID RICH PLAQUE: Primary | ICD-10-CM

## 2022-11-08 DIAGNOSIS — I25.83 CORONARY ARTERY DISEASE DUE TO LIPID RICH PLAQUE: Primary | ICD-10-CM

## 2022-11-08 DIAGNOSIS — E78.49 OTHER HYPERLIPIDEMIA: ICD-10-CM

## 2022-11-08 LAB
ALBUMIN SERPL-MCNC: 4 G/DL (ref 3.5–5.2)
ALBUMIN/GLOB SERPL: 1.2 G/DL
ALP SERPL-CCNC: 121 U/L (ref 39–117)
ALT SERPL W P-5'-P-CCNC: 22 U/L (ref 1–33)
ANION GAP SERPL CALCULATED.3IONS-SCNC: 10.1 MMOL/L (ref 5–15)
AST SERPL-CCNC: 21 U/L (ref 1–32)
BILIRUB SERPL-MCNC: 0.5 MG/DL (ref 0–1.2)
BUN SERPL-MCNC: 12 MG/DL (ref 8–23)
BUN/CREAT SERPL: 14.6 (ref 7–25)
CALCIUM SPEC-SCNC: 9.7 MG/DL (ref 8.6–10.5)
CHLORIDE SERPL-SCNC: 99 MMOL/L (ref 98–107)
CHOLEST SERPL-MCNC: 139 MG/DL (ref 0–200)
CO2 SERPL-SCNC: 29.9 MMOL/L (ref 22–29)
CREAT SERPL-MCNC: 0.82 MG/DL (ref 0.57–1)
EGFRCR SERPLBLD CKD-EPI 2021: 80 ML/MIN/1.73
GLOBULIN UR ELPH-MCNC: 3.3 GM/DL
GLUCOSE SERPL-MCNC: 86 MG/DL (ref 65–99)
HBA1C MFR BLD: 5.9 % (ref 4.8–5.6)
HDLC SERPL-MCNC: 49 MG/DL (ref 40–60)
LDLC SERPL CALC-MCNC: 73 MG/DL (ref 0–100)
LDLC/HDLC SERPL: 1.46 {RATIO}
POTASSIUM SERPL-SCNC: 4 MMOL/L (ref 3.5–5.2)
PROT SERPL-MCNC: 7.3 G/DL (ref 6–8.5)
SODIUM SERPL-SCNC: 139 MMOL/L (ref 136–145)
TRIGL SERPL-MCNC: 92 MG/DL (ref 0–150)
VLDLC SERPL-MCNC: 17 MG/DL (ref 5–40)

## 2022-11-08 PROCEDURE — 83036 HEMOGLOBIN GLYCOSYLATED A1C: CPT | Performed by: FAMILY MEDICINE

## 2022-11-08 PROCEDURE — 80053 COMPREHEN METABOLIC PANEL: CPT | Performed by: FAMILY MEDICINE

## 2022-11-08 PROCEDURE — 99214 OFFICE O/P EST MOD 30 MIN: CPT | Performed by: FAMILY MEDICINE

## 2022-11-08 PROCEDURE — 80061 LIPID PANEL: CPT | Performed by: FAMILY MEDICINE

## 2022-11-08 PROCEDURE — 36415 COLL VENOUS BLD VENIPUNCTURE: CPT | Performed by: FAMILY MEDICINE

## 2022-11-08 RX ORDER — METOPROLOL SUCCINATE 100 MG/1
100 TABLET, EXTENDED RELEASE ORAL DAILY
COMMUNITY
Start: 2022-08-09

## 2022-11-08 RX ORDER — DULAGLUTIDE 0.75 MG/.5ML
0.75 INJECTION, SOLUTION SUBCUTANEOUS WEEKLY
Qty: 2 ML | Refills: 0 | Status: SHIPPED | OUTPATIENT
Start: 2022-11-08 | End: 2023-02-01

## 2022-11-08 RX ORDER — PANTOPRAZOLE SODIUM 40 MG/1
40 TABLET, DELAYED RELEASE ORAL EVERY MORNING
Qty: 90 TABLET | Refills: 3 | Status: SHIPPED | OUTPATIENT
Start: 2022-11-08

## 2022-11-08 RX ORDER — DULAGLUTIDE 1.5 MG/.5ML
1.5 INJECTION, SOLUTION SUBCUTANEOUS WEEKLY
Qty: 2 ML | Refills: 2 | Status: SHIPPED | OUTPATIENT
Start: 2022-11-08 | End: 2023-02-24

## 2022-11-08 NOTE — ASSESSMENT & PLAN NOTE
Overall her diabetic control is been great.  She like to switch back to the Trulicity with the recent change in her insurance.

## 2022-11-08 NOTE — ASSESSMENT & PLAN NOTE
Her blood pressure is great here as well as at home.  We will continue her current meds and update her labs.

## 2022-11-08 NOTE — PROGRESS NOTES
Chief Complaint   Patient presents with   • Follow-up     4 month requesting refill on pantoprazole    • Hypertension   • Hyperlipidemia        Subjective     Ifeoma Stout  has a past medical history of Colon cancer screening (05/02/2019), Diabetes mellitus (HCC) (7/20/2020), History of chemotherapy, History of staph infection, Hypertension, Migraine, Post-menopausal bleeding, and Uterine fibroid.    Type 2 diabetes- she states her blood sugars have been great.  They range anywhere from .  With the Rybelsus she has had significant weight loss.    Hypertension- her blood pressure at home has been great to.  Her numbers are similar to what it is here today at 120/60.    Hyperlipidemia- she takes her pravastatin on a nightly basis.    Coronary artery disease- she denies any chest pain tightness heaviness.  She also denies any palpitations or tachycardia.      PHQ-2 Depression Screening  Little interest or pleasure in doing things?     Feeling down, depressed, or hopeless?     PHQ-2 Total Score     PHQ-9 Depression Screening  Little interest or pleasure in doing things?     Feeling down, depressed, or hopeless?     Trouble falling or staying asleep, or sleeping too much?     Feeling tired or having little energy?     Poor appetite or overeating?     Feeling bad about yourself - or that you are a failure or have let yourself or your family down?     Trouble concentrating on things, such as reading the newspaper or watching television?     Moving or speaking so slowly that other people could have noticed? Or the opposite - being so fidgety or restless that you have been moving around a lot more than usual?     Thoughts that you would be better off dead, or of hurting yourself in some way?     PHQ-9 Total Score     If you checked off any problems, how difficult have these problems made it for you to do your work, take care of things at home, or get along with other people?       Allergies   Allergen Reactions   •  Dapagliflozin Rash     Other reaction(s): Rash     • Adhesive Tape Rash       Prior to Admission medications    Medication Sig Start Date End Date Taking? Authorizing Provider   allopurinol (ZYLOPRIM) 300 MG tablet Take 1 tablet by mouth Daily. 6/7/22  Yes Peter Higgins DO   ALPRAZolam (XANAX) 0.5 MG tablet TAKE ONE TABLET BY MOUTH TWICE A DAY AS NEEDED FOR ANXIETY. FOR UP TO 30 DAYS 5/2/22  Yes Peter Higgins DO   aspirin 81 MG chewable tablet Chew 81 mg Daily.   Yes Jeimy Mathis MD   cetirizine (zyrTEC) 10 MG tablet Take 10 mg by mouth Daily.   Yes Jeimy Mathis MD   Cholecalciferol (VITAMIN D-3) 1000 units capsule Take 1 Units by mouth Daily.   Yes Jeimy Mathis MD   lisinopril-hydrochlorothiazide (PRINZIDE,ZESTORETIC) 10-12.5 MG per tablet Take 1 tablet by mouth Daily. 7/23/21  Yes Jeimy Mathis MD   metoprolol succinate XL (TOPROL-XL) 100 MG 24 hr tablet Take 1 tablet by mouth Daily. 10/12/21  Yes Jeimy Mathis MD   metoprolol succinate XL (TOPROL-XL) 100 MG 24 hr tablet Take 1 tablet by mouth Daily. 8/9/22  Yes Jeimy Mathis MD   Multiple Vitamins-Minerals (MULTIVITAMIN ADULTS PO) Take 1 tablet/day by mouth Daily.   Yes Jeimy Mathis MD   ondansetron ODT (ZOFRAN-ODT) 4 MG disintegrating tablet DISSOLVE 1 TABLET ON THE TONGUE EVERY 8 HOURS AS NEEDED FOR NAUSEA OR VOMITING. 10/12/22  Yes Peter Higgins DO   pantoprazole (PROTONIX) 40 MG EC tablet Take 40 mg by mouth Every Morning.   Yes Jeimy Mathis MD   pravastatin (PRAVACHOL) 20 MG tablet Take 1 tablet by mouth Daily. 11/5/21  Yes Jeimy Mathis MD   rOPINIRole (REQUIP) 0.5 MG tablet Take 1 tablet by mouth Daily. 11/3/21  Yes Jeimy Mathis MD   Semaglutide (Rybelsus) 3 MG tablet Take 1 tablet by mouth Daily. For 1 month then increase to 2 tabs daily. 7/8/22  Yes Peter Higgins DO        Patient Active Problem List   Diagnosis   • Uterine  leiomyoma   • Acquired absence of bilateral breasts and nipples   • Anxiety   • Malignant neoplasm of breast (HCC)   • Abnormal cardiovascular stress test   • Coronary artery disease due to lipid rich plaque   • Depressive disorder   • Dysfunctional uterine bleeding   • Electrocardiogram abnormal   • Esophageal dysmotility   • Hypertension   • Estrogen receptor positive tumor status   • Primary gout   • History of gastroesophageal reflux (GERD)   • Menopause   • Migraine   • Obesity with body mass index 30 or greater   • Restless leg syndrome   • Sleep pattern disturbance   • Diabetes mellitus (HCC)   • Hallux valgus   • Other hyperlipidemia        Past Surgical History:   Procedure Laterality Date   • DILATATION AND CURETTAGE      X2   • DILATATION AND CURETTAGE N/A 1/29/2018    Procedure: DILATATION AND CURETTAGE;  Surgeon: Moriah Schmitz MD;  Location: [facility];  Service:    • ENDOMETRIAL ABLATION     • MASTECTOMY      WITH BREAST RECONSTRUCITON   • MASTECTOMY Bilateral    • TOTAL LAPAROSCOPIC HYSTERECTOMY Bilateral 3/12/2018    Procedure: TOTAL HYSTERECTOMY BILATERAL SALPINGECTOMY BILATERAL SALPINGO-OOPHORECTOMY DA CHERY ROBOT;  Surgeon: Karley Banda MD;  Location: [facility];  Service: [facility]       Social History     Socioeconomic History   • Marital status:    Tobacco Use   • Smoking status: Never   • Smokeless tobacco: Never   Vaping Use   • Vaping Use: Never used   Substance and Sexual Activity   • Alcohol use: Yes     Alcohol/week: 1.0 standard drink     Types: 1 Glasses of wine per week     Comment: WEEK   • Drug use: No   • Sexual activity: Defer       Family History   Problem Relation Age of Onset   • Colon cancer Sister    • Heart disease Father    • Malig Hyperthermia Neg Hx        Family history, surgical history, past medical history, Allergies and meds reviewed with patient today and updated in [facility] EMR.     ROS:  Review of Systems   Constitutional: Negative for fatigue.  "  HENT: Negative for congestion, postnasal drip and rhinorrhea.    Eyes: Negative for blurred vision and visual disturbance.   Respiratory: Negative for cough, chest tightness, shortness of breath and wheezing.    Cardiovascular: Negative for chest pain and palpitations.   Endocrine: Negative for polydipsia and polyuria.   Allergic/Immunologic: Negative for environmental allergies.   Neurological: Negative for headache.   Psychiatric/Behavioral: Negative for depressed mood. The patient is not nervous/anxious.        OBJECTIVE:  Vitals:    11/08/22 1008   BP: 120/60   BP Location: Right arm   Patient Position: Sitting   Pulse: 73   Temp: 98.2 °F (36.8 °C)   SpO2: 99%   Weight: 108 kg (239 lb)   Height: 177.8 cm (70\")     No results found.   Body mass index is 34.29 kg/m².  No LMP recorded. Patient is postmenopausal.    Physical Exam  Vitals and nursing note reviewed.   Constitutional:       General: She is not in acute distress.     Appearance: Normal appearance. She is obese.   HENT:      Head: Normocephalic.      Right Ear: Tympanic membrane, ear canal and external ear normal.      Left Ear: Tympanic membrane, ear canal and external ear normal.      Nose: Nose normal.      Mouth/Throat:      Mouth: Mucous membranes are moist.      Pharynx: Oropharynx is clear.   Eyes:      General: No scleral icterus.     Conjunctiva/sclera: Conjunctivae normal.      Pupils: Pupils are equal, round, and reactive to light.   Cardiovascular:      Rate and Rhythm: Normal rate and regular rhythm.      Pulses: Normal pulses.      Heart sounds: Normal heart sounds. No murmur heard.  Pulmonary:      Effort: Pulmonary effort is normal.      Breath sounds: Normal breath sounds. No wheezing, rhonchi or rales.   Musculoskeletal:      Cervical back: Neck supple. No rigidity or tenderness.   Lymphadenopathy:      Cervical: No cervical adenopathy.   Skin:     General: Skin is warm and dry.      Coloration: Skin is not jaundiced.      Findings: " No rash.   Neurological:      General: No focal deficit present.      Mental Status: She is alert and oriented to person, place, and time.   Psychiatric:         Mood and Affect: Mood normal.         Thought Content: Thought content normal.         Judgment: Judgment normal.         Procedures    No visits with results within 30 Day(s) from this visit.   Latest known visit with results is:   Office Visit on 07/08/2022   Component Date Value Ref Range Status   • Glucose 07/08/2022 175 (H)  65 - 99 mg/dL Final   • BUN 07/08/2022 11  8 - 23 mg/dL Final   • Creatinine 07/08/2022 0.74  0.57 - 1.00 mg/dL Final   • Sodium 07/08/2022 138  136 - 145 mmol/L Final   • Potassium 07/08/2022 4.1  3.5 - 5.2 mmol/L Final   • Chloride 07/08/2022 98  98 - 107 mmol/L Final   • CO2 07/08/2022 28.7  22.0 - 29.0 mmol/L Final   • Calcium 07/08/2022 9.4  8.6 - 10.5 mg/dL Final   • BUN/Creatinine Ratio 07/08/2022 14.9  7.0 - 25.0 Final   • Anion Gap 07/08/2022 11.3  5.0 - 15.0 mmol/L Final   • eGFR 07/08/2022 90.5  >60.0 mL/min/1.73 Final    National Kidney Foundation and American Society of Nephrology (ASN) Task Force recommended calculation based on the Chronic Kidney Disease Epidemiology Collaboration (CKD-EPI) equation refit without adjustment for race.   • Hemoglobin A1C 07/08/2022 7.00 (H)  4.80 - 5.60 % Final       ASSESSMENT/ PLAN:    Diagnoses and all orders for this visit:    1. Coronary artery disease due to lipid rich plaque (Primary)  Assessment & Plan:  She is doing well with any current anginal-like complaints.    Orders:  -     Lipid Panel    2. Primary hypertension  Assessment & Plan:  Her blood pressure is great here as well as at home.  We will continue her current meds and update her labs.    Orders:  -     Comprehensive Metabolic Panel  -     Lipid Panel    3. Other hyperlipidemia  Assessment & Plan:  Update her lipid profile with her labs here today.    Orders:  -     Comprehensive Metabolic Panel  -     Lipid  Panel    4. Type 2 diabetes mellitus without complication, without long-term current use of insulin ([facility])  Assessment & Plan:  Overall her diabetic control is been great.  She like to switch back to the Trulicity with the recent change in her insurance.    Orders:  -     Comprehensive Metabolic Panel  -     Lipid Panel  -     Hemoglobin A1c    Other orders  -     pantoprazole (PROTONIX) 40 MG EC tablet; Take 1 tablet by mouth Every Morning.  Dispense: 90 tablet; Refill: 3  -     Dulaglutide (Trulicity) 0.75 MG/0.5ML solution pen-injector; Inject 0.75 mg under the skin into the appropriate area as directed 1 (One) Time Per Week.  Dispense: 2 mL; Refill: 0  -     Dulaglutide (Trulicity) 1.5 MG/0.5ML solution pen-injector; Inject 1.5 mg under the skin into the appropriate area as directed 1 (One) Time Per Week.  Dispense: 2 mL; Refill: 2      Orders Placed Today:     New Medications Ordered This Visit   Medications   • pantoprazole (PROTONIX) 40 MG EC tablet     Sig: Take 1 tablet by mouth Every Morning.     Dispense:  90 tablet     Refill:  3   • Dulaglutide (Trulicity) 0.75 MG/0.5ML solution pen-injector     Sig: Inject 0.75 mg under the skin into the appropriate area as directed 1 (One) Time Per Week.     Dispense:  2 mL     Refill:  0     Month #1   • Dulaglutide (Trulicity) 1.5 MG/0.5ML solution pen-injector     Sig: Inject 1.5 mg under the skin into the appropriate area as directed 1 (One) Time Per Week.     Dispense:  2 mL     Refill:  2     Start month #2        Management Plan:     An After Visit Summary was printed and given to the patient at discharge.    Follow-up: Return in about 4 months (around 3/8/2023) for Recheck.    Peter Higgins,  11/8/2022 10:52 EST  This note was electronically signed.

## 2022-12-12 ENCOUNTER — TELEPHONE (OUTPATIENT)
Dept: FAMILY MEDICINE CLINIC | Facility: CLINIC | Age: 67
End: 2022-12-12

## 2022-12-12 DIAGNOSIS — H91.91 HEARING LOSS OF RIGHT EAR, UNSPECIFIED HEARING LOSS TYPE: Primary | ICD-10-CM

## 2022-12-12 NOTE — TELEPHONE ENCOUNTER
Caller: Ifeoma Stout    Relationship: Self    Best call back number: [phone]    What is the medical concern/diagnosis: HEARING LOSS IN RIGHT EAR    What specialty or service is being requested: ENT    What is the office location: [facility] additional details: PATIENT STATES SHE WENT FOR A HEARING TEST AND WAS TOLD SHE NEEDS TO SEE AN ENT.

## 2023-01-25 NOTE — TELEPHONE ENCOUNTER
Caller: Jose M Stout    Relationship: Self    Best call back number: [phone]    Who are you requesting to speak with (clinical staff, provider,  specific staff member): DO JIM     Do you know the name of the person who called: JOSE M STOUT    What was the call regarding: PATIENT WOULD LIKE TO DISCUSS GETTING MORE SAMPLES OF THE HE HAD BEEN GIVING HER.     Do you require a callback: YES           [Postnasal Drip] : postnasal drip [Negative] : Heme/Lymph

## 2023-02-01 ENCOUNTER — OFFICE VISIT (OUTPATIENT)
Dept: OTOLARYNGOLOGY | Facility: CLINIC | Age: 68
End: 2023-02-01
Payer: MEDICARE

## 2023-02-01 VITALS — TEMPERATURE: 96.2 F | WEIGHT: 249.8 LBS | BODY MASS INDEX: 37 KG/M2 | HEIGHT: 69 IN

## 2023-02-01 DIAGNOSIS — J31.0 CHRONIC RHINITIS: ICD-10-CM

## 2023-02-01 DIAGNOSIS — H90.A22 SENSORINEURAL HEARING LOSS (SNHL) OF LEFT EAR WITH RESTRICTED HEARING OF RIGHT EAR: ICD-10-CM

## 2023-02-01 DIAGNOSIS — H90.A31 MIXED CONDUCTIVE AND SENSORINEURAL HEARING LOSS OF RIGHT EAR WITH RESTRICTED HEARING OF LEFT EAR: Primary | ICD-10-CM

## 2023-02-01 PROCEDURE — 99203 OFFICE O/P NEW LOW 30 MIN: CPT | Performed by: OTOLARYNGOLOGY

## 2023-02-01 RX ORDER — FLUTICASONE PROPIONATE 50 MCG
2 SPRAY, SUSPENSION (ML) NASAL DAILY
Qty: 16 G | Refills: 11 | Status: SHIPPED | OUTPATIENT
Start: 2023-02-01

## 2023-02-01 NOTE — PROGRESS NOTES
Patient Name: Ifeoma Stout   Visit Date: 02/01/2023   Patient ID: [id number]  Provider: Reggie Mills MD    Sex: female  Location: [facility]   YOB: 1955  Location Address: [address] [place] [address], [address] [place] [address],?[place]?[id number]    Primary Care Provider Peter Higgins DO  Location Phone: [phone]    Referring Provider: Peter Higgins,*        Chief Complaint  Hearing Loss    History of Present Illness  Ifeoma Stout is a 67 y.o. female who presents to [facility] today as a consult from Peter Higgins,* for evaluation of her ears.  She tells me that she experienced a severe episode of right-sided otitis media as a child and has noticed decreased hearing in that ear ever since.  It seems to have worsened over the last few years and makes her job as a  difficult at times.  She denies any history of noise exposure but does report a family history of hearing loss in her mother.  She does mention right-sided tinnitus which sounds like hissing.  She denies any otalgia, otorrhea, or vertigo.  She does mention occasional rhinorrhea and nasal congestion for which she takes cetirizine.  Audiogram at [facility] on 12/12/2022 revealed left-sided normal downsloping to mild to moderate high-frequency sensorineural hearing loss and right mild to moderate mixed loss.  Word recognition score was 100% on the right at 70 dB and 90% on the left at 55 dB.  Tympanograms were not performed.      Past Medical History:   Diagnosis Date   • Colon cancer screening 05/02/2019    polyps in proximal descending colon and 3mm polyp in distal rectum  Recommended follow up colonoscopy in 3 years  Dr Tanner   • Diabetes mellitus (HCC) 07/20/2020   • GERD (gastroesophageal reflux disease) 2017   • History of chemotherapy     LAST TREATMENT APRIL 2007   • History of staph infection     S/P MASTECTOMY RT BREAST   • HL  (hearing loss) 2020   • Hypertension    • Migraine    • Post-menopausal bleeding    • Uterine fibroid        Past Surgical History:   Procedure Laterality Date   • BREAST SURGERY  2006    Double Mastectomy   • COSMETIC SURGERY  2007    Breast reconstruction   • DILATATION AND CURETTAGE      X2   • DILATATION AND CURETTAGE N/A 01/29/2018    Procedure: DILATATION AND CURETTAGE;  Surgeon: Moriah Schmitz MD;  Location: [facility];  Service:    • ENDOMETRIAL ABLATION     • MASTECTOMY      WITH BREAST RECONSTRUCITON   • MASTECTOMY Bilateral    • TOTAL LAPAROSCOPIC HYSTERECTOMY Bilateral 03/12/2018    Procedure: TOTAL HYSTERECTOMY BILATERAL SALPINGECTOMY BILATERAL SALPINGO-OOPHORECTOMY DA CHERY ROBOT;  Surgeon: Karley Banda MD;  Location: [facility];  Service: [facility]         Current Outpatient Medications:   •  allopurinol (ZYLOPRIM) 300 MG tablet, Take 1 tablet by mouth Daily., Disp: 90 tablet, Rfl: 3  •  ALPRAZolam (XANAX) 0.5 MG tablet, TAKE ONE TABLET BY MOUTH TWICE A DAY AS NEEDED FOR ANXIETY. FOR UP TO 30 DAYS, Disp: 30 tablet, Rfl: 0  •  aspirin 81 MG chewable tablet, Chew 81 mg Daily., Disp: , Rfl:   •  cetirizine (zyrTEC) 10 MG tablet, Take 10 mg by mouth Daily., Disp: , Rfl:   •  Dulaglutide (Trulicity) 1.5 MG/0.5ML solution pen-injector, Inject 1.5 mg under the skin into the appropriate area as directed 1 (One) Time Per Week., Disp: 2 mL, Rfl: 2  •  lisinopril-hydrochlorothiazide (PRINZIDE,ZESTORETIC) 10-12.5 MG per tablet, Take 1 tablet by mouth Daily., Disp: , Rfl:   •  metoprolol succinate XL (TOPROL-XL) 100 MG 24 hr tablet, Take 1 tablet by mouth Daily., Disp: , Rfl:   •  Multiple Vitamins-Minerals (MULTIVITAMIN ADULTS PO), Take 1 tablet/day by mouth Daily., Disp: , Rfl:   •  ondansetron ODT (ZOFRAN-ODT) 4 MG disintegrating tablet, DISSOLVE 1 TABLET ON THE TONGUE EVERY 8 HOURS AS NEEDED FOR NAUSEA OR VOMITING., Disp: 10 tablet, Rfl: 0  •  pantoprazole (PROTONIX) 40 MG EC tablet, Take 1  "tablet by mouth Every Morning., Disp: 90 tablet, Rfl: 3  •  pravastatin (PRAVACHOL) 20 MG tablet, Take 1 tablet by mouth Daily., Disp: , Rfl:   •  rOPINIRole (REQUIP) 0.5 MG tablet, Take 1 tablet by mouth Daily., Disp: , Rfl:   •  fluticasone (FLONASE) 50 MCG/ACT nasal spray, 2 sprays into the nostril(s) as directed by provider Daily., Disp: 16 g, Rfl: 11     Allergies   Allergen Reactions   • Dapagliflozin Rash     Other reaction(s): Rash     • Adhesive Tape Rash       Social History     Tobacco Use   • Smoking status: Never   • Smokeless tobacco: Never   Vaping Use   • Vaping Use: Never used   Substance Use Topics   • Alcohol use: Yes     Alcohol/week: 1.0 standard drink     Types: 1 Glasses of wine per week     Comment: About one time per month   • Drug use: No        Objective     Vital Signs:   Temp 96.2 °F (35.7 °C) (Temporal)   Ht 175.3 cm (69\")   Wt 113 kg (249 lb 12.8 oz)   BMI 36.89 kg/m²       Physical Exam    General: Well developed, well nourished patient of stated age in no acute distress. Voice is strong and clear.   Head: Normocephalic and atraumatic.  Face: No lesions.  Bilateral parotid and submandibular glands are unremarkable.  Stensen's and Warthin's ducts are productive of clear saliva bilaterally.  House-Brackmann I/VI     bilaterally.   muscles and temporomandibular joint nontender to palpation.  No TMJ crepitus.  Eyes: PERRLA, sclerae anicteric, no conjunctival injection. Extraocular movements are intact and full. No nystagmus.   Ears: Auricles are normal in appearance. Bilateral external auditory canals are unremarkable. Bilateral tympanic membranes are clear and without effusion. Hearing normal to conversational voice.   Nose: External nose is normal in appearance. Bilateral nares are patent with normal appearing mucosa. Septum midline. Turbinates are unremarkable. No lesions.   Oral Cavity: Lips are normal in appearance. Oral mucosa is unremarkable. Gingiva is unremarkable. " Normal dentition for age. Tongue is unremarkable with good movement. Hard palate is unremarkable.   Oropharynx: Soft palate is unremarkable with full movement. Uvula is unremarkable. Bilateral tonsils are unremarkable. Posterior oropharynx is unremarkable.    Larynx and hypopharynx: Deferred secondary to gag reflex.  Neck: Supple.  No mass.  Nontender to palpation.  Trachea midline. Thyroid normal size and without nodules to palpation.   Lymphatic: No lymphadenopathy upon palpation.  Respiratory: Clear to auscultation bilaterally, nonlabored respirations    Cardiovascular: RRR, no murmurs, rubs, or gallops,   Psychiatric: Appropriate affect, cooperative   Neurologic: Oriented x 3, strength symmetric in all extremities, Cranial Nerves II-XII are grossly intact to confrontation   Skin: Warm and dry. No rashes.    Procedures           Result Review :               Assessment and Plan    Diagnoses and all orders for this visit:    1. Mixed conductive and sensorineural hearing loss of right ear with restricted hearing of left ear (Primary)    2. Sensorineural hearing loss (SNHL) of left ear with restricted hearing of right ear    3. Chronic rhinitis  -     fluticasone (FLONASE) 50 MCG/ACT nasal spray; 2 sprays into the nostril(s) as directed by provider Daily.  Dispense: 16 g; Refill: 11      Impressions and findings were discussed at great length.  Currently, we reviewed and discussed the results of her 12/12/2022 audiogram.  Options for further evaluation and management were discussed including further work-up with a CT scan of the temporal bones to develop a better idea of the conductive component in her right ear versus traditional amplification.  We discussed the risks, benefits, and alternatives.  After thorough discussion she elected to pursue amplification and is cleared binaurally if so desired.  She was given ample time to ask questions, all of which were answered to her satisfaction.      Follow Up   No  follow-ups on file.  Patient was given instructions and counseling regarding her condition or for health maintenance advice. Please see specific information pulled into the AVS if appropriate.

## 2023-02-07 ENCOUNTER — TELEPHONE (OUTPATIENT)
Dept: FAMILY MEDICINE CLINIC | Facility: CLINIC | Age: 68
End: 2023-02-07

## 2023-02-07 ENCOUNTER — OFFICE VISIT (OUTPATIENT)
Dept: OBSTETRICS AND GYNECOLOGY | Facility: CLINIC | Age: 68
End: 2023-02-07
Payer: MEDICARE

## 2023-02-07 VITALS
HEIGHT: 69 IN | SYSTOLIC BLOOD PRESSURE: 128 MMHG | WEIGHT: 253.4 LBS | BODY MASS INDEX: 37.53 KG/M2 | DIASTOLIC BLOOD PRESSURE: 80 MMHG | HEART RATE: 73 BPM

## 2023-02-07 DIAGNOSIS — Z01.419 ENCOUNTER FOR GYNECOLOGICAL EXAMINATION WITHOUT ABNORMAL FINDING: Primary | ICD-10-CM

## 2023-02-07 PROCEDURE — G0123 SCREEN CERV/VAG THIN LAYER: HCPCS | Performed by: NURSE PRACTITIONER

## 2023-02-07 PROCEDURE — G0101 CA SCREEN;PELVIC/BREAST EXAM: HCPCS | Performed by: NURSE PRACTITIONER

## 2023-02-07 RX ORDER — ROPINIROLE 1 MG/1
1 TABLET, FILM COATED ORAL DAILY
Qty: 90 TABLET | Refills: 3 | Status: SHIPPED | OUTPATIENT
Start: 2023-02-07

## 2023-02-07 NOTE — TELEPHONE ENCOUNTER
Caller: Ifeoma Stout    Relationship: Self    Best call back number: [phone]    Requested Prescriptions:   INCREASE TO 1MG OF rOPINIRole (REQUIP) 0.5 MG tablet     Pharmacy where request should be sent: [facility] [id number] - [place] - [address] AT [facility] [address] - [phone] [facility] [phone] FX     Additional details provided by patient: PATIENT TAKES THIS MEDICATION AS 2 DAILY AND SHE WOULD LIKE TO INCREASE IT TO 1 MG SO SHE DOESN'T HAVE TO TAKE TWO. SHE CURRENTLY HAS 4 DAYS LEFT.     Does the patient have less than a 3 day supply:  [] Yes  [x] No    Would you like a call back once the refill request has been completed: [] Yes [x] No    If the office needs to give you a call back, can they leave a voicemail: [] Yes [x] No    Jacob Lima Rep   02/07/23 09:23 EST

## 2023-02-07 NOTE — PROGRESS NOTES
"Well Woman Visit    CC: Scheduled annual well gyn visit  Chief Complaint   Patient presents with   • Gynecologic Exam       Myriad intake in the past?: no  NOT DONE TODAY due to: patient declines    HPI:   67 y.o.No obstetric history on file.   Social History     Substance and Sexual Activity   Sexual Activity Defer       Urinary QUALITY measure 66yo: Patient is 64 yo or older and DENIES concerns with urinary incontinence  PCP: does manage PMHx and preventative labs  History: PMHx, Meds, Allergies, PSHx, Social Hx, and POBHx all reviewed and updated.    Pt has no complaints today. Patient is requesting a pap smear due to personal history of cancer.    PHYSICAL EXAM:  /80 (BP Location: Right arm, Patient Position: Sitting, Cuff Size: Adult)   Pulse 73   Ht 175.3 cm (69\")   Wt 115 kg (253 lb 6.4 oz)   Breastfeeding No   BMI 37.42 kg/m²  Not found.  General- NAD, alert and oriented, appropriate  Psych- Normal mood, good memory  Neck- No masses, no thyroid enlargement  CV- Regular rhythm, no murnurs  Resp- CTA to bases, no wheezes  Abdomen- Soft, non distended, non tender, no masses    Breast- bilateral mastectomy    External genitalia- Normal female, no lesions  Urethra/meatus- Normal, no masses, non tender  Bladder- Normal, no masses, non tender  Vagina- Normal, no atrophy, no lesions, no discharge.  Prolapse: none noted, not examined with split speculum to delineate  Cvx- Absent  Uterus- Absent  Adnexa- Absent  Anus/Rectum/Perineum- Not performed    Lymphatic- No palpable neck, axillary, or groin nodes  Ext- No edema, no cyanosis    Skin- No lesions, no rashes, no acanthosis nigricans      ASSESSMENT and PLAN:    Diagnoses and all orders for this visit:    1. Encounter for gynecological examination without abnormal finding (Primary)  -     IGP,rfx Aptima HPV All Pth        Preventative:  • CERVICAL CANCER Screening- Reviewed current ASCCP guidelines for screening w and wo cotest HPV, age specific.  Screen: " Updated today  • COLON CANCER Screening- Reviewed current medical society guidelines and options.  Screen:  patient has already scheduled her screening  • SEXUAL HEALTH: Declines STD screening  • VACCINATIONS Recommended: COVID and booster PRN, Flu annually, Gardisil/HPV vaccine (up to 46yo).  Importance discussed, risk being unvaccinated reviewed.  Questions answered  • Smoking status- NON SMOKER  • Follow up PCP/Specialist PMHx and Labs  MYRIAD: patient declines at this time  SAFE SEX/condoms importance reviewed.      She understands the importance of having any ordered tests to be performed in a timely fashion.  The risks of not performing them include, but are not limited to, advanced cancer stages, bone loss from osteoporosis and/or subsequent increase in morbidity and/or mortality.  She is encouraged to review her results online and/or contact or office if she has questions.     Follow Up:  Return as needed.            Grayson Donovan, APRN  02/07/2023    [facility] [address]  [facility]  [address] [place] [address]  Dept: [phone]  Loc: [phone]

## 2023-02-14 LAB
CONV .: NORMAL
CYTOLOGIST CVX/VAG CYTO: NORMAL
CYTOLOGY CVX/VAG DOC CYTO: NORMAL
CYTOLOGY CVX/VAG DOC THIN PREP: NORMAL
DX ICD CODE: NORMAL
HIV 1 & 2 AB SER-IMP: NORMAL
OTHER STN SPEC: NORMAL
STAT OF ADQ CVX/VAG CYTO-IMP: NORMAL

## 2023-02-24 RX ORDER — DULAGLUTIDE 1.5 MG/.5ML
INJECTION, SOLUTION SUBCUTANEOUS
Qty: 2 ML | Refills: 2 | Status: SHIPPED | OUTPATIENT
Start: 2023-02-24

## 2023-03-10 ENCOUNTER — OFFICE VISIT (OUTPATIENT)
Dept: FAMILY MEDICINE CLINIC | Facility: CLINIC | Age: 68
End: 2023-03-10
Payer: MEDICARE

## 2023-03-10 VITALS
BODY MASS INDEX: 37.03 KG/M2 | DIASTOLIC BLOOD PRESSURE: 68 MMHG | HEIGHT: 69 IN | OXYGEN SATURATION: 99 % | WEIGHT: 250 LBS | SYSTOLIC BLOOD PRESSURE: 147 MMHG | HEART RATE: 82 BPM | TEMPERATURE: 96.8 F

## 2023-03-10 DIAGNOSIS — I25.10 CORONARY ARTERY DISEASE DUE TO LIPID RICH PLAQUE: Primary | ICD-10-CM

## 2023-03-10 DIAGNOSIS — Z11.59 NEED FOR HEPATITIS C SCREENING TEST: ICD-10-CM

## 2023-03-10 DIAGNOSIS — I10 PRIMARY HYPERTENSION: ICD-10-CM

## 2023-03-10 DIAGNOSIS — E66.9 OBESITY WITH BODY MASS INDEX 30 OR GREATER: ICD-10-CM

## 2023-03-10 DIAGNOSIS — E78.49 OTHER HYPERLIPIDEMIA: ICD-10-CM

## 2023-03-10 DIAGNOSIS — I25.83 CORONARY ARTERY DISEASE DUE TO LIPID RICH PLAQUE: Primary | ICD-10-CM

## 2023-03-10 DIAGNOSIS — E11.9 TYPE 2 DIABETES MELLITUS WITHOUT COMPLICATION, WITHOUT LONG-TERM CURRENT USE OF INSULIN: ICD-10-CM

## 2023-03-10 LAB
ALBUMIN SERPL-MCNC: 4.2 G/DL (ref 3.5–5.2)
ALBUMIN/GLOB SERPL: 1.4 G/DL
ALP SERPL-CCNC: 130 U/L (ref 39–117)
ALT SERPL W P-5'-P-CCNC: 21 U/L (ref 1–33)
ANION GAP SERPL CALCULATED.3IONS-SCNC: 10.4 MMOL/L (ref 5–15)
AST SERPL-CCNC: 16 U/L (ref 1–32)
BILIRUB SERPL-MCNC: 0.5 MG/DL (ref 0–1.2)
BUN SERPL-MCNC: 14 MG/DL (ref 8–23)
BUN/CREAT SERPL: 17.5 (ref 7–25)
CALCIUM SPEC-SCNC: 10 MG/DL (ref 8.6–10.5)
CHLORIDE SERPL-SCNC: 101 MMOL/L (ref 98–107)
CHOLEST SERPL-MCNC: 150 MG/DL (ref 0–200)
CO2 SERPL-SCNC: 28.6 MMOL/L (ref 22–29)
CREAT SERPL-MCNC: 0.8 MG/DL (ref 0.57–1)
EGFRCR SERPLBLD CKD-EPI 2021: 81.9 ML/MIN/1.73
GLOBULIN UR ELPH-MCNC: 3.1 GM/DL
GLUCOSE SERPL-MCNC: 103 MG/DL (ref 65–99)
HBA1C MFR BLD: 6.2 % (ref 4.8–5.6)
HCV AB SER DONR QL: NORMAL
HDLC SERPL-MCNC: 51 MG/DL (ref 40–60)
LDLC SERPL CALC-MCNC: 83 MG/DL (ref 0–100)
LDLC/HDLC SERPL: 1.6 {RATIO}
POTASSIUM SERPL-SCNC: 4.7 MMOL/L (ref 3.5–5.2)
PROT SERPL-MCNC: 7.3 G/DL (ref 6–8.5)
SODIUM SERPL-SCNC: 140 MMOL/L (ref 136–145)
TRIGL SERPL-MCNC: 86 MG/DL (ref 0–150)
VLDLC SERPL-MCNC: 16 MG/DL (ref 5–40)

## 2023-03-10 PROCEDURE — 80061 LIPID PANEL: CPT | Performed by: FAMILY MEDICINE

## 2023-03-10 PROCEDURE — 86803 HEPATITIS C AB TEST: CPT | Performed by: FAMILY MEDICINE

## 2023-03-10 PROCEDURE — 83036 HEMOGLOBIN GLYCOSYLATED A1C: CPT | Performed by: FAMILY MEDICINE

## 2023-03-10 PROCEDURE — 3077F SYST BP >= 140 MM HG: CPT | Performed by: FAMILY MEDICINE

## 2023-03-10 PROCEDURE — 36415 COLL VENOUS BLD VENIPUNCTURE: CPT | Performed by: FAMILY MEDICINE

## 2023-03-10 PROCEDURE — 99214 OFFICE O/P EST MOD 30 MIN: CPT | Performed by: FAMILY MEDICINE

## 2023-03-10 PROCEDURE — 3078F DIAST BP <80 MM HG: CPT | Performed by: FAMILY MEDICINE

## 2023-03-10 PROCEDURE — 80053 COMPREHEN METABOLIC PANEL: CPT | Performed by: FAMILY MEDICINE

## 2023-03-10 NOTE — PROGRESS NOTES
Chief Complaint   Patient presents with   • Follow-up     4 month    • Hypertension   • Hyperlipidemia        Subjective     Ifeoma Stout  has a past medical history of Colon cancer screening (05/02/2019), Diabetes mellitus (HCC) (07/20/2020), GERD (gastroesophageal reflux disease) (2017), History of chemotherapy, History of staph infection, HL (hearing loss) (2020), Hypertension, Migraine, Post-menopausal bleeding, and Uterine fibroid.    CAD-     Diabetes  She presents for her follow-up diabetic visit. She has type 2 diabetes mellitus. MedicAlert identification noted. The initial diagnosis of diabetes was made 4 Years ago. Pertinent negatives for hypoglycemia include no confusion, dizziness, headaches, hunger, mood changes, nervousness/anxiousness, pallor, seizures, sleepiness, speech difficulty, sweats or tremors. Pertinent negatives for diabetes include no blurred vision, no chest pain, no fatigue, no foot paresthesias, no foot ulcerations, no polydipsia, no polyphagia, no polyuria, no visual change, no weakness and no weight loss. Pertinent negatives for hypoglycemia complications include no blackouts, no hospitalization, no nocturnal hypoglycemia, no required assistance and no required glucagon injection. Symptoms are stable. Diabetic complications include heart disease. Pertinent negatives for diabetic complications include no CVA, impotence, nephropathy, peripheral neuropathy, PVD or retinopathy. Risk factors for coronary artery disease include diabetes mellitus, dyslipidemia, hypertension and obesity. Current diabetic treatment includes oral agent (monotherapy). She is compliant with treatment all of the time. She is currently taking insulin pre-breakfast. Insulin injections are given by patient. Rotation sites for injection include the abdominal wall. Her weight is decreasing steadily. She is following a generally healthy diet. When asked about meal planning, she reported none. She has not had a previous  visit with a dietitian. She rarely participates in exercise. She monitors blood glucose at home 1-2 x per week. She monitors urine at home <1 x per month. Blood glucose monitoring compliance is excellent. There is no change in her home blood glucose trend. Her breakfast blood glucose is taken between 9-10 am. Her breakfast blood glucose range is generally 70-90 mg/dl. Her highest blood glucose is 70-90 mg/dl. Her overall blood glucose range is  mg/dl. She sees a podiatrist.Eye exam is current.   Hypertension  This is a chronic problem. The current episode started more than 1 year ago. The problem is unchanged. The problem is controlled. Pertinent negatives include no blurred vision, chest pain, headaches, shortness of breath or sweats. Risk factors for coronary artery disease include diabetes mellitus, dyslipidemia, obesity and post-menopausal state. Current antihypertension treatment includes ACE inhibitors, beta blockers and diuretics. The current treatment provides significant improvement. There are no compliance problems.  There is no history of CVA, PVD or retinopathy.   Hyperlipidemia  This is a chronic problem. The current episode started more than 1 year ago. The problem is controlled. Recent lipid tests were reviewed and are normal. Exacerbating diseases include diabetes and obesity. Pertinent negatives include no chest pain or shortness of breath. Current antihyperlipidemic treatment includes statins. The current treatment provides significant improvement of lipids. There are no compliance problems.  Risk factors for coronary artery disease include diabetes mellitus, dyslipidemia, hypertension, obesity and post-menopausal.       PHQ-2 Depression Screening  Little interest or pleasure in doing things?     Feeling down, depressed, or hopeless?     PHQ-2 Total Score     PHQ-9 Depression Screening  Little interest or pleasure in doing things?     Feeling down, depressed, or hopeless?     Trouble falling  or staying asleep, or sleeping too much?     Feeling tired or having little energy?     Poor appetite or overeating?     Feeling bad about yourself - or that you are a failure or have let yourself or your family down?     Trouble concentrating on things, such as reading the newspaper or watching television?     Moving or speaking so slowly that other people could have noticed? Or the opposite - being so fidgety or restless that you have been moving around a lot more than usual?     Thoughts that you would be better off dead, or of hurting yourself in some way?     PHQ-9 Total Score     If you checked off any problems, how difficult have these problems made it for you to do your work, take care of things at home, or get along with other people?       Allergies   Allergen Reactions   • Dapagliflozin Rash     Other reaction(s): Rash     • Adhesive Tape Rash       Prior to Admission medications    Medication Sig Start Date End Date Taking? Authorizing Provider   allopurinol (ZYLOPRIM) 300 MG tablet Take 1 tablet by mouth Daily. 6/7/22  Yes Peter Higgins, DO   ALPRAZolam (XANAX) 0.5 MG tablet TAKE ONE TABLET BY MOUTH TWICE A DAY AS NEEDED FOR ANXIETY. FOR UP TO 30 DAYS 5/2/22  Yes Peter Higgins, DO   aspirin 81 MG chewable tablet Chew 1 tablet Daily.   Yes Jeimy Mathis MD   cetirizine (zyrTEC) 10 MG tablet Take 1 tablet by mouth Daily.   Yes Jeimy Mathis MD   fluticasone (FLONASE) 50 MCG/ACT nasal spray 2 sprays into the nostril(s) as directed by provider Daily. 2/1/23  Yes Reggie Mills MD   lisinopril-hydrochlorothiazide (PRINZIDE,ZESTORETIC) 10-12.5 MG per tablet Take 1 tablet by mouth Daily. 7/23/21  Yes Jeimy Mathis MD   metoprolol succinate XL (TOPROL-XL) 100 MG 24 hr tablet Take 1 tablet by mouth Daily. 8/9/22  Yes Jeimy Mathis MD   Multiple Vitamins-Minerals (MULTIVITAMIN ADULTS PO) Take 1 tablet by mouth Daily.   Yes Jeimy Mathis MD    ondansetron ODT (ZOFRAN-ODT) 4 MG disintegrating tablet DISSOLVE 1 TABLET ON THE TONGUE EVERY 8 HOURS AS NEEDED FOR NAUSEA OR VOMITING. 10/12/22  Yes Peter Higgins DO   pantoprazole (PROTONIX) 40 MG EC tablet Take 1 tablet by mouth Every Morning. 11/8/22  Yes Peter Higgins DO   pravastatin (PRAVACHOL) 20 MG tablet Take 1 tablet by mouth Daily. 11/5/21  Yes Provider, MD Jeimy   rOPINIRole (REQUIP) 1 MG tablet Take 1 tablet by mouth Daily. 2/7/23  Yes Peter Higgins DO   Trulicity 1.5 MG/0.5ML solution pen-injector INJECT 1.5 MG UNDER THE SKIN ONCE WEEKLY 2/24/23  Yes Peter Higgins DO        Patient Active Problem List   Diagnosis   • Uterine leiomyoma   • Acquired absence of bilateral breasts and nipples   • Anxiety   • Malignant neoplasm of breast (HCC)   • Abnormal cardiovascular stress test   • Coronary artery disease due to lipid rich plaque   • Depressive disorder   • Dysfunctional uterine bleeding   • Electrocardiogram abnormal   • Esophageal dysmotility   • Hypertension   • Estrogen receptor positive tumor status   • Primary gout   • History of gastroesophageal reflux (GERD)   • Menopause   • Migraine   • Obesity with body mass index 30 or greater   • Restless leg syndrome   • Sleep pattern disturbance   • Diabetes mellitus (HCC)   • Hallux valgus   • Other hyperlipidemia        Past Surgical History:   Procedure Laterality Date   • BREAST SURGERY  2006    Double Mastectomy   • COSMETIC SURGERY  2007    Breast reconstruction   • DILATATION AND CURETTAGE      X2   • DILATATION AND CURETTAGE N/A 01/29/2018    Procedure: DILATATION AND CURETTAGE;  Surgeon: Moriah Schmitz MD;  Location: [facility];  Service:    • ENDOMETRIAL ABLATION     • MASTECTOMY      WITH BREAST RECONSTRUCITON   • MASTECTOMY Bilateral    • TOTAL LAPAROSCOPIC HYSTERECTOMY Bilateral 03/12/2018    Procedure: TOTAL HYSTERECTOMY BILATERAL SALPINGECTOMY BILATERAL SALPINGO-OOPHORECTOMY DA CHERY  "ROBOT;  Surgeon: Karley Banda MD;  Location: [facility];  Service: [facility]       Social History     Socioeconomic History   • Marital status:    Tobacco Use   • Smoking status: Never     Passive exposure: Never   • Smokeless tobacco: Never   Vaping Use   • Vaping Use: Never used   Substance and Sexual Activity   • Alcohol use: Yes     Alcohol/week: 1.0 standard drink     Types: 1 Glasses of wine per week     Comment: About one time per month   • Drug use: No   • Sexual activity: Defer       Family History   Problem Relation Age of Onset   • Colon cancer Sister    • Cancer Sister         Colon/Liver   • Diabetes Sister    • Heart disease Father    • Malig Hyperthermia Neg Hx        Family history, surgical history, past medical history, Allergies and meds reviewed with patient today and updated in [facility] EMR.     ROS:  Review of Systems   Constitutional: Negative for fatigue and unexpected weight loss.   HENT: Negative for congestion, postnasal drip and rhinorrhea.    Eyes: Negative for blurred vision and visual disturbance.   Respiratory: Negative for cough, chest tightness, shortness of breath and wheezing.    Cardiovascular: Negative for chest pain.   Gastrointestinal: Negative for constipation and diarrhea.   Endocrine: Negative for polydipsia, polyphagia and polyuria.   Genitourinary: Negative for impotence.   Skin: Negative for pallor.   Neurological: Negative for dizziness, tremors, seizures, speech difficulty, weakness and confusion.   Psychiatric/Behavioral: The patient is not nervous/anxious.        OBJECTIVE:  Vitals:    03/10/23 1005   BP: 147/68   BP Location: Right arm   Patient Position: Sitting   Pulse: 82   Temp: 96.8 °F (36 °C)   SpO2: 99%   Weight: 113 kg (250 lb)   Height: 175.3 cm (69\")     No results found.   Body mass index is 36.92 kg/m².  No LMP recorded. Patient has had a hysterectomy.    Physical Exam  Vitals and nursing note reviewed.   Constitutional:       General: She is " not in acute distress.     Appearance: Normal appearance. She is obese.   HENT:      Head: Normocephalic.      Right Ear: Tympanic membrane, ear canal and external ear normal.      Left Ear: Tympanic membrane, ear canal and external ear normal.      Nose: Nose normal.      Mouth/Throat:      Mouth: Mucous membranes are moist.      Pharynx: Oropharynx is clear.   Eyes:      General: No scleral icterus.     Conjunctiva/sclera: Conjunctivae normal.      Pupils: Pupils are equal, round, and reactive to light.   Cardiovascular:      Rate and Rhythm: Normal rate and regular rhythm.      Pulses: Normal pulses.      Heart sounds: Normal heart sounds. No murmur heard.  Pulmonary:      Effort: Pulmonary effort is normal.      Breath sounds: Normal breath sounds. No wheezing, rhonchi or rales.   Musculoskeletal:      Cervical back: Neck supple. No rigidity or tenderness.   Lymphadenopathy:      Cervical: No cervical adenopathy.   Skin:     General: Skin is warm and dry.      Coloration: Skin is not jaundiced.      Findings: No rash.   Neurological:      General: No focal deficit present.      Mental Status: She is alert and oriented to person, place, and time.   Psychiatric:         Mood and Affect: Mood normal.         Thought Content: Thought content normal.         Judgment: Judgment normal.         Procedures    No visits with results within 30 Day(s) from this visit.   Latest known visit with results is:   Office Visit on 02/07/2023   Component Date Value Ref Range Status   • Diagnosis 02/07/2023 Comment   Final    NEGATIVE FOR INTRAEPITHELIAL LESION OR MALIGNANCY.   • Specimen adequacy: 02/07/2023 Comment   Final    Satisfactory for evaluation.  No endocervical cells are present.  This is  consistent with a history of hysterectomy.   • Clinician Provided ICD-10: 02/07/2023 Comment   Final    Z01.419   • Performed by: 02/07/2023 Comment   Final    Meryl Dougherty, Cytotechnologist (ASCP)   • . 02/07/2023 .   Final   • Note:  02/07/2023 Comment   Final    The Pap smear is a screening test designed to aid in the detection of  premalignant and malignant conditions of the uterine cervix.  It is not a  diagnostic procedure and should not be used as the sole means of detecting  cervical cancer.  Both false-positive and false-negative reports do occur.   • Method: 02/07/2023 Comment   Final    This liquid based ThinPrep(R) pap test was screened with the  use of an image guided system.   • Conv .conv 02/07/2023 Comment   Final    The HPV DNA reflex criteria were not met with this specimen result  therefore, no HPV testing was performed.       ASSESSMENT/ PLAN:    Diagnoses and all orders for this visit:    1. Coronary artery disease due to lipid rich plaque (Primary)  Assessment & Plan:  She is doing well and denies any current anginal-like complaints.      2. Primary hypertension  Assessment & Plan:  Her home blood pressure readings are consistently much better than here.  We will continue her current meds and update her labs.    Orders:  -     Comprehensive Metabolic Panel  -     Lipid Panel    3. Other hyperlipidemia  Assessment & Plan:  Update her lipid profile with her labs here today.    Orders:  -     Comprehensive Metabolic Panel  -     Lipid Panel    4. Type 2 diabetes mellitus without complication, without long-term current use of insulin (HCC)  Assessment & Plan:  Her last A1c was excellent at 5.9.  Currently she is just on the Trulicity.  We will update her A1c with her labs here today.    Orders:  -     Comprehensive Metabolic Panel  -     Lipid Panel  -     Hemoglobin A1c    5. Obesity with body mass index 30 or greater  Assessment & Plan:  Patient's (Body mass index is 36.92 kg/m².) indicates that they are morbidly/severely obese (BMI > 40 or > 35 with obesity - related health condition) with health conditions that include hypertension, coronary heart disease, diabetes mellitus and dyslipidemias . Weight is unchanged. BMI  is  above average; BMI management plan is completed. We discussed portion control and increasing exercise.         Orders Placed Today:     No orders of the defined types were placed in this encounter.       Management Plan:     An After Visit Summary was printed and given to the patient at discharge.    Follow-up: Return in about 6 months (around 9/10/2023) for Recheck.    Peter Higgins,  3/10/2023 10:47 EST  This note was electronically signed.  Answers for HPI/ROS submitted by the patient on 3/3/2023  What is the primary reason for your visit?: Diabetes

## 2023-03-10 NOTE — ASSESSMENT & PLAN NOTE
Her last A1c was excellent at 5.9.  Currently she is just on the Trulicity.  We will update her A1c with her labs here today.

## 2023-03-10 NOTE — ASSESSMENT & PLAN NOTE
Her home blood pressure readings are consistently much better than here.  We will continue her current meds and update her labs.

## 2023-03-10 NOTE — ASSESSMENT & PLAN NOTE
Patient's (Body mass index is 36.92 kg/m².) indicates that they are morbidly/severely obese (BMI > 40 or > 35 with obesity - related health condition) with health conditions that include hypertension, coronary heart disease, diabetes mellitus and dyslipidemias . Weight is unchanged. BMI  is above average; BMI management plan is completed. We discussed portion control and increasing exercise.

## 2023-03-13 RX ORDER — ATORVASTATIN CALCIUM 40 MG/1
40 TABLET, FILM COATED ORAL NIGHTLY
Qty: 90 TABLET | Refills: 1 | Status: SHIPPED | OUTPATIENT
Start: 2023-03-13

## 2023-04-21 ENCOUNTER — TELEPHONE (OUTPATIENT)
Dept: FAMILY MEDICINE CLINIC | Facility: CLINIC | Age: 68
End: 2023-04-21
Payer: MEDICARE

## 2023-04-21 RX ORDER — ALPRAZOLAM 0.5 MG/1
0.5 TABLET ORAL 2 TIMES DAILY PRN
Qty: 30 TABLET | Refills: 0 | Status: SHIPPED | OUTPATIENT
Start: 2023-04-21

## 2023-04-21 NOTE — TELEPHONE ENCOUNTER
Patient called asking refill for xanax refill. She is due for a UDS. Patient is asking if she can just come in and get a UDS without a appt. Please advise.

## 2023-04-27 ENCOUNTER — CLINICAL SUPPORT (OUTPATIENT)
Dept: FAMILY MEDICINE CLINIC | Facility: CLINIC | Age: 68
End: 2023-04-27
Payer: MEDICARE

## 2023-04-27 DIAGNOSIS — Z79.899 LONG-TERM USE OF HIGH-RISK MEDICATION: Primary | ICD-10-CM

## 2023-05-22 RX ORDER — DULAGLUTIDE 1.5 MG/.5ML
INJECTION, SOLUTION SUBCUTANEOUS
Qty: 2 ML | Refills: 4 | Status: SHIPPED | OUTPATIENT
Start: 2023-05-22

## 2023-05-30 RX ORDER — ALPRAZOLAM 0.5 MG/1
TABLET ORAL
Qty: 30 TABLET | Refills: 1 | Status: SHIPPED | OUTPATIENT
Start: 2023-05-30

## 2023-05-30 RX ORDER — ALLOPURINOL 300 MG/1
TABLET ORAL
Qty: 90 TABLET | Refills: 3 | Status: SHIPPED | OUTPATIENT
Start: 2023-05-30

## 2023-08-21 RX ORDER — ALPRAZOLAM 0.5 MG/1
TABLET ORAL
Qty: 30 TABLET | Refills: 0 | Status: SHIPPED | OUTPATIENT
Start: 2023-08-21

## 2023-09-12 ENCOUNTER — OFFICE VISIT (OUTPATIENT)
Dept: FAMILY MEDICINE CLINIC | Facility: CLINIC | Age: 68
End: 2023-09-12
Payer: MEDICARE

## 2023-09-12 VITALS
HEART RATE: 75 BPM | BODY MASS INDEX: 36.73 KG/M2 | HEIGHT: 69 IN | SYSTOLIC BLOOD PRESSURE: 119 MMHG | TEMPERATURE: 97.8 F | WEIGHT: 248 LBS | DIASTOLIC BLOOD PRESSURE: 66 MMHG | OXYGEN SATURATION: 97 %

## 2023-09-12 DIAGNOSIS — I10 PRIMARY HYPERTENSION: ICD-10-CM

## 2023-09-12 DIAGNOSIS — E78.49 OTHER HYPERLIPIDEMIA: ICD-10-CM

## 2023-09-12 DIAGNOSIS — I25.10 CORONARY ARTERY DISEASE DUE TO LIPID RICH PLAQUE: Primary | ICD-10-CM

## 2023-09-12 DIAGNOSIS — F41.9 ANXIETY: ICD-10-CM

## 2023-09-12 DIAGNOSIS — E11.9 TYPE 2 DIABETES MELLITUS WITHOUT COMPLICATION, WITHOUT LONG-TERM CURRENT USE OF INSULIN: ICD-10-CM

## 2023-09-12 DIAGNOSIS — I25.83 CORONARY ARTERY DISEASE DUE TO LIPID RICH PLAQUE: Primary | ICD-10-CM

## 2023-09-12 LAB
ALBUMIN SERPL-MCNC: 4 G/DL (ref 3.5–5.2)
ALBUMIN/GLOB SERPL: 1.3 G/DL
ALP SERPL-CCNC: 144 U/L (ref 39–117)
ALT SERPL W P-5'-P-CCNC: 12 U/L (ref 1–33)
ANION GAP SERPL CALCULATED.3IONS-SCNC: 9.9 MMOL/L (ref 5–15)
AST SERPL-CCNC: 16 U/L (ref 1–32)
BILIRUB SERPL-MCNC: 0.5 MG/DL (ref 0–1.2)
BUN SERPL-MCNC: 14 MG/DL (ref 8–23)
BUN/CREAT SERPL: 15.4 (ref 7–25)
CALCIUM SPEC-SCNC: 9.3 MG/DL (ref 8.6–10.5)
CHLORIDE SERPL-SCNC: 101 MMOL/L (ref 98–107)
CHOLEST SERPL-MCNC: 119 MG/DL (ref 0–200)
CO2 SERPL-SCNC: 28.1 MMOL/L (ref 22–29)
CREAT SERPL-MCNC: 0.91 MG/DL (ref 0.57–1)
EGFRCR SERPLBLD CKD-EPI 2021: 70.2 ML/MIN/1.73
GLOBULIN UR ELPH-MCNC: 3.2 GM/DL
GLUCOSE SERPL-MCNC: 125 MG/DL (ref 65–99)
HBA1C MFR BLD: 6.7 % (ref 4.8–5.6)
HDLC SERPL-MCNC: 52 MG/DL (ref 40–60)
LDLC SERPL CALC-MCNC: 53 MG/DL (ref 0–100)
LDLC/HDLC SERPL: 1.03 {RATIO}
POTASSIUM SERPL-SCNC: 4.4 MMOL/L (ref 3.5–5.2)
PROT SERPL-MCNC: 7.2 G/DL (ref 6–8.5)
SODIUM SERPL-SCNC: 139 MMOL/L (ref 136–145)
TRIGL SERPL-MCNC: 67 MG/DL (ref 0–150)
VLDLC SERPL-MCNC: 14 MG/DL (ref 5–40)

## 2023-09-12 PROCEDURE — 80061 LIPID PANEL: CPT | Performed by: FAMILY MEDICINE

## 2023-09-12 PROCEDURE — 83036 HEMOGLOBIN GLYCOSYLATED A1C: CPT | Performed by: FAMILY MEDICINE

## 2023-09-12 PROCEDURE — 80053 COMPREHEN METABOLIC PANEL: CPT | Performed by: FAMILY MEDICINE

## 2023-09-12 NOTE — ASSESSMENT & PLAN NOTE
Class 2 Severe Obesity (BMI >=35 and <=39.9). Obesity-related health conditions include the following: hypertension, coronary heart disease, diabetes mellitus, and dyslipidemias. Obesity is unchanged. BMI is is above average; BMI management plan is completed. We discussed low calorie, low carb based diet program, portion control, and increasing exercise.
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
## 2023-09-12 NOTE — ASSESSMENT & PLAN NOTE
She has some persistent anxiety and panic attacks due to some extended family dynamics.  I offered to add on a routine medication for some anxiety and she declined at this time.

## 2023-09-26 RX ORDER — ATORVASTATIN CALCIUM 40 MG/1
TABLET, FILM COATED ORAL
Qty: 90 TABLET | Refills: 1 | Status: SHIPPED | OUTPATIENT
Start: 2023-09-26

## 2023-09-28 ENCOUNTER — EXTERNAL PBMM DATA (OUTPATIENT)
Dept: PHARMACY | Facility: OTHER | Age: 68
End: 2023-09-28
Payer: MEDICARE

## 2023-10-16 RX ORDER — DULAGLUTIDE 1.5 MG/.5ML
INJECTION, SOLUTION SUBCUTANEOUS
Qty: 2 ML | Refills: 4 | Status: SHIPPED | OUTPATIENT
Start: 2023-10-16

## 2023-10-31 RX ORDER — PANTOPRAZOLE SODIUM 40 MG/1
40 TABLET, DELAYED RELEASE ORAL EVERY MORNING
Qty: 90 TABLET | Refills: 3 | Status: SHIPPED | OUTPATIENT
Start: 2023-10-31

## 2023-11-13 RX ORDER — ALPRAZOLAM 0.5 MG/1
TABLET ORAL
Qty: 30 TABLET | Refills: 1 | Status: SHIPPED | OUTPATIENT
Start: 2023-11-13

## 2024-02-01 RX ORDER — ONDANSETRON 4 MG/1
TABLET, ORALLY DISINTEGRATING ORAL
Qty: 10 TABLET | Refills: 0 | OUTPATIENT
Start: 2024-02-01

## 2024-02-01 RX ORDER — ALLOPURINOL 300 MG/1
300 TABLET ORAL DAILY
Qty: 90 TABLET | Refills: 3 | Status: SHIPPED | OUTPATIENT
Start: 2024-02-01

## 2024-02-01 RX ORDER — METOPROLOL SUCCINATE 100 MG/1
100 TABLET, EXTENDED RELEASE ORAL DAILY
Qty: 90 TABLET | Refills: 3 | Status: SHIPPED | OUTPATIENT
Start: 2024-02-01

## 2024-02-01 RX ORDER — ATORVASTATIN CALCIUM 40 MG/1
40 TABLET, FILM COATED ORAL NIGHTLY
Qty: 90 TABLET | Refills: 3 | Status: SHIPPED | OUTPATIENT
Start: 2024-02-01

## 2024-02-01 RX ORDER — DULAGLUTIDE 1.5 MG/.5ML
1.5 INJECTION, SOLUTION SUBCUTANEOUS WEEKLY
Qty: 6 ML | Refills: 3 | Status: SHIPPED | OUTPATIENT
Start: 2024-02-01

## 2024-02-01 RX ORDER — LISINOPRIL AND HYDROCHLOROTHIAZIDE 12.5; 1 MG/1; MG/1
1 TABLET ORAL DAILY
Qty: 90 TABLET | Refills: 3 | Status: SHIPPED | OUTPATIENT
Start: 2024-02-01

## 2024-02-01 RX ORDER — PANTOPRAZOLE SODIUM 40 MG/1
40 TABLET, DELAYED RELEASE ORAL EVERY MORNING
Qty: 90 TABLET | Refills: 3 | Status: SHIPPED | OUTPATIENT
Start: 2024-02-01

## 2024-02-01 NOTE — TELEPHONE ENCOUNTER
Caller: Ifeoma Stout LIDYA    Relationship: Self    Best call back number: [phone]    Requested Prescriptions:   TRANSFER ALL MEDICATIONS TO [facility], [facility] MAIL ORDER     Requested Prescriptions     Pending Prescriptions Disp Refills    allopurinol (ZYLOPRIM) 300 MG tablet 90 tablet 3     Sig: Take 1 tablet by mouth Daily.    atorvastatin (LIPITOR) 40 MG tablet 90 tablet 1     Sig: Take 1 tablet by mouth Every Night.    lisinopril-hydrochlorothiazide (PRINZIDE,ZESTORETIC) 10-12.5 MG per tablet       Sig: Take 1 tablet by mouth Daily.    metoprolol succinate XL (TOPROL-XL) 100 MG 24 hr tablet       Sig: Take 1 tablet by mouth Daily.    ondansetron ODT (ZOFRAN-ODT) 4 MG disintegrating tablet 10 tablet 0    pantoprazole (PROTONIX) 40 MG EC tablet 90 tablet 3     Sig: Take 1 tablet by mouth Every Morning.    Dulaglutide (Trulicity) 1.5 MG/0.5ML solution pen-injector 2 mL 4     Sig: Inject 1.5 mg under the skin into the appropriate area as directed 1 (One) Time Per Week.        [facility] [address] [phone] [place] [phone] [facility]     Last office visit with prescribing clinician: 9/12/2023   Last telemedicine visit with prescribing clinician: Visit date not found   Next office visit with prescribing clinician: 3/12/2024     Additional details provided by patient: PATIENT HAS 2 WEEKS LEFT CURRENTLY ON EACH MEDICATION.     Does the patient have less than a 3 day supply:  [] Yes  [x] No    Would you like a call back once the refill request has been completed: [] Yes [x] No    If the office needs to give you a call back, can they leave a voicemail: [] Yes [x] No    Jacob Lima   02/01/24 11:06 EST

## 2024-02-21 RX ORDER — DULAGLUTIDE 1.5 MG/.5ML
1.5 INJECTION, SOLUTION SUBCUTANEOUS WEEKLY
Qty: 6 ML | Refills: 3 | Status: SHIPPED | OUTPATIENT
Start: 2024-02-21

## 2024-02-21 NOTE — TELEPHONE ENCOUNTER
Caller: YNES KRISHNAMURTHYKS FROM [facility] [address] [phone] [facility] [phone] [facility]    Relationship: Pharmacy    Best call back number: [phone]     Requested Prescriptions:   Requested Prescriptions     Pending Prescriptions Disp Refills    Dulaglutide (Trulicity) 1.5 MG/0.5ML solution pen-injector 6 mL 3     Sig: Inject 1.5 mg under the skin into the appropriate area as directed 1 (One) Time Per Week.        Pharmacy where request should be sent: [facility] [id number] - [place], [place] - [address] AT [facility] [address] & [facility] [phone] [facility] [phone] [facility]     Last office visit with prescribing clinician: 9/12/2023   Last telemedicine visit with prescribing clinician: Visit date not found   Next office visit with prescribing clinician: 3/12/2024     Additional details provided by patient: CALLER IS FROM [facility]. CALLER STATES PATIENT NEEDS SHORT TERM EMERGENCY SUPPLY FOR A MONTH OF MEDICATION. CALLER STATES MEDICATION IS CURRENTLY UNAVAILABLE FOR THEM AND PATIENT IS COMPLETELY OUT.     Does the patient have less than a 3 day supply:  [x] Yes  [] No    Would you like a call back once the refill request has been completed: [] Yes [] No    If the office needs to give you a call back, can they leave a voicemail: [] Yes [] No    Jacob Eldridge Rep   02/21/24 14:58 EST

## 2024-03-04 RX ORDER — DULAGLUTIDE 3 MG/.5ML
3 INJECTION, SOLUTION SUBCUTANEOUS WEEKLY
Qty: 6 ML | Refills: 1 | Status: SHIPPED | OUTPATIENT
Start: 2024-03-04

## 2024-03-12 ENCOUNTER — OFFICE VISIT (OUTPATIENT)
Dept: FAMILY MEDICINE CLINIC | Facility: CLINIC | Age: 69
End: 2024-03-12
Payer: MEDICARE

## 2024-03-12 VITALS
TEMPERATURE: 97.6 F | WEIGHT: 256.8 LBS | HEIGHT: 69 IN | HEART RATE: 73 BPM | SYSTOLIC BLOOD PRESSURE: 114 MMHG | OXYGEN SATURATION: 97 % | DIASTOLIC BLOOD PRESSURE: 66 MMHG | BODY MASS INDEX: 38.03 KG/M2

## 2024-03-12 DIAGNOSIS — Z12.11 SCREENING FOR COLON CANCER: ICD-10-CM

## 2024-03-12 DIAGNOSIS — I25.83 CORONARY ARTERY DISEASE DUE TO LIPID RICH PLAQUE: ICD-10-CM

## 2024-03-12 DIAGNOSIS — E78.49 OTHER HYPERLIPIDEMIA: Primary | ICD-10-CM

## 2024-03-12 DIAGNOSIS — I10 PRIMARY HYPERTENSION: ICD-10-CM

## 2024-03-12 DIAGNOSIS — I25.10 CORONARY ARTERY DISEASE DUE TO LIPID RICH PLAQUE: ICD-10-CM

## 2024-03-12 DIAGNOSIS — E11.9 TYPE 2 DIABETES MELLITUS WITHOUT COMPLICATION, WITHOUT LONG-TERM CURRENT USE OF INSULIN: ICD-10-CM

## 2024-03-12 LAB
ALBUMIN SERPL-MCNC: 4 G/DL (ref 3.5–5.2)
ALBUMIN UR-MCNC: <1.2 MG/DL
ALBUMIN/GLOB SERPL: 1.2 G/DL
ALP SERPL-CCNC: 161 U/L (ref 39–117)
ALT SERPL W P-5'-P-CCNC: 20 U/L (ref 1–33)
ANION GAP SERPL CALCULATED.3IONS-SCNC: 11.4 MMOL/L (ref 5–15)
AST SERPL-CCNC: 18 U/L (ref 1–32)
BILIRUB SERPL-MCNC: 0.3 MG/DL (ref 0–1.2)
BUN SERPL-MCNC: 11 MG/DL (ref 8–23)
BUN/CREAT SERPL: 14.1 (ref 7–25)
CALCIUM SPEC-SCNC: 9.5 MG/DL (ref 8.6–10.5)
CHLORIDE SERPL-SCNC: 101 MMOL/L (ref 98–107)
CHOLEST SERPL-MCNC: 125 MG/DL (ref 0–200)
CO2 SERPL-SCNC: 27.6 MMOL/L (ref 22–29)
CREAT SERPL-MCNC: 0.78 MG/DL (ref 0.57–1)
CREAT UR-MCNC: 222.6 MG/DL
EGFRCR SERPLBLD CKD-EPI 2021: 83.9 ML/MIN/1.73
GLOBULIN UR ELPH-MCNC: 3.3 GM/DL
GLUCOSE SERPL-MCNC: 98 MG/DL (ref 65–99)
HBA1C MFR BLD: 6.8 % (ref 4.8–5.6)
HDLC SERPL-MCNC: 48 MG/DL (ref 40–60)
LDLC SERPL CALC-MCNC: 58 MG/DL (ref 0–100)
LDLC/HDLC SERPL: 1.17 {RATIO}
MICROALBUMIN/CREAT UR: NORMAL MG/G{CREAT}
POTASSIUM SERPL-SCNC: 4.4 MMOL/L (ref 3.5–5.2)
PROT SERPL-MCNC: 7.3 G/DL (ref 6–8.5)
SODIUM SERPL-SCNC: 140 MMOL/L (ref 136–145)
TRIGL SERPL-MCNC: 104 MG/DL (ref 0–150)
VLDLC SERPL-MCNC: 19 MG/DL (ref 5–40)

## 2024-03-12 PROCEDURE — 82043 UR ALBUMIN QUANTITATIVE: CPT | Performed by: FAMILY MEDICINE

## 2024-03-12 PROCEDURE — 82570 ASSAY OF URINE CREATININE: CPT | Performed by: FAMILY MEDICINE

## 2024-03-12 PROCEDURE — 80061 LIPID PANEL: CPT | Performed by: FAMILY MEDICINE

## 2024-03-12 PROCEDURE — 83036 HEMOGLOBIN GLYCOSYLATED A1C: CPT | Performed by: FAMILY MEDICINE

## 2024-03-12 PROCEDURE — 80053 COMPREHEN METABOLIC PANEL: CPT | Performed by: FAMILY MEDICINE

## 2024-03-12 NOTE — PROGRESS NOTES
Chief Complaint   Patient presents with    Follow-up    Coronary artery disease due to lipid rich plaque    Med Refill     Zofran         Subjective     Ifeoma Stout  has a past medical history of Colon cancer screening (05/02/2019), Diabetes mellitus (07/20/2020), GERD (gastroesophageal reflux disease) (2017), History of chemotherapy, History of staph infection, HL (hearing loss) (2020), Hypertension, Migraine, Post-menopausal bleeding, and Uterine fibroid.    CAD-she denies any chest pain tightness or heaviness.  No palpitations or tachycardia.    Depression-her younger sister passed away in November due to metastatic colon cancer.  She was unable to make it out to visit her at that time due to having an acute gastroenteritis with fever nausea vomiting and diarrhea.  As a result her family chose to leave her out of the obituary and his not been very friendly since.  She has recently though been going and seeing a counselor.  She states that has helped immensely.    Diabetes  She presents for her follow-up diabetic visit. She has type 2 diabetes mellitus. No MedicAlert identification noted. The initial diagnosis of diabetes was made 8 years ago. Pertinent negatives for hypoglycemia include no confusion, headaches, nervousness/anxiousness, speech difficulty, sweats or tremors. Pertinent negatives for diabetes include no blurred vision, no chest pain, no fatigue, no foot paresthesias, no foot ulcerations, no polydipsia, no polyuria and no weight loss. Risk factors for coronary artery disease include diabetes mellitus, dyslipidemia, obesity and sedentary lifestyle. She is compliant with treatment all of the time. She is currently taking insulin at bedtime. Insulin injections are given by patient. Rotation sites for injection include the abdominal wall. She is following a generally healthy diet. Meal planning includes avoidance of concentrated sweets. She rarely participates in exercise. Her breakfast blood glucose  range is generally  mg/dl. She does not see a podiatrist. Eye exam is not current.   Hypertension  This is a chronic problem. The current episode started more than 1 year ago. The problem is unchanged. The problem is controlled. Pertinent negatives include no blurred vision, chest pain, headaches, palpitations, shortness of breath or sweats. Risk factors for coronary artery disease include dyslipidemia, diabetes mellitus, obesity, sedentary lifestyle and post-menopausal state. Current antihypertension treatment includes ACE inhibitors and beta blockers. The current treatment provides significant improvement. There are no compliance problems.  Hypertensive end-organ damage includes CAD/MI.   Hyperlipidemia  This is a chronic problem. The current episode started more than 1 year ago. The problem is controlled. Recent lipid tests were reviewed and are normal. Exacerbating diseases include diabetes. Pertinent negatives include no chest pain or shortness of breath. Current antihyperlipidemic treatment includes statins. The current treatment provides significant improvement of lipids. There are no compliance problems.  Risk factors for coronary artery disease include diabetes mellitus, dyslipidemia, obesity, hypertension, post-menopausal and a sedentary lifestyle.       PHQ-2 Depression Screening  Little interest or pleasure in doing things?     Feeling down, depressed, or hopeless?     PHQ-2 Total Score     PHQ-9 Depression Screening  Little interest or pleasure in doing things?     Feeling down, depressed, or hopeless?     Trouble falling or staying asleep, or sleeping too much?     Feeling tired or having little energy?     Poor appetite or overeating?     Feeling bad about yourself - or that you are a failure or have let yourself or your family down?     Trouble concentrating on things, such as reading the newspaper or watching television?     Moving or speaking so slowly that other people could have noticed? Or  the opposite - being so fidgety or restless that you have been moving around a lot more than usual?     Thoughts that you would be better off dead, or of hurting yourself in some way?     PHQ-9 Total Score     If you checked off any problems, how difficult have these problems made it for you to do your work, take care of things at home, or get along with other people?       Allergies   Allergen Reactions    Dapagliflozin Rash     Other reaction(s): Rash      Adhesive Tape Rash       Prior to Admission medications    Medication Sig Start Date End Date Taking? Authorizing Provider   allopurinol (ZYLOPRIM) 300 MG tablet Take 1 tablet by mouth Daily. 2/1/24  Yes Peter Higgins DO   ALPRAZolam (XANAX) 0.5 MG tablet TAKE ONE TABLET BY MOUTH TWICE A DAY AS NEEDED FOR ANXIETY 11/13/23  Yes Peter Higgins DO   aspirin 81 MG chewable tablet Chew 1 tablet Daily.   Yes Jeimy Mathis MD   atorvastatin (LIPITOR) 40 MG tablet Take 1 tablet by mouth Every Night. 2/1/24  Yes Peter Higgins DO   cetirizine (zyrTEC) 10 MG tablet Take 1 tablet by mouth Daily.   Yes Jeimy Mathis MD   Dulaglutide (Trulicity) 3 MG/0.5ML solution pen-injector Inject 0.5 mL under the skin into the appropriate area as directed 1 (One) Time Per Week. 3/4/24  Yes Peter Higgins DO   lisinopril-hydrochlorothiazide (PRINZIDE,ZESTORETIC) 10-12.5 MG per tablet Take 1 tablet by mouth Daily. 2/1/24  Yes Peter Higgins DO   metoprolol succinate XL (TOPROL-XL) 100 MG 24 hr tablet Take 1 tablet by mouth Daily. 2/1/24  Yes Peter Higgins, DO   Multiple Vitamins-Minerals (MULTIVITAMIN ADULTS PO) Take 1 tablet by mouth Daily.   Yes Jeimy Mathis MD   ondansetron ODT (ZOFRAN-ODT) 4 MG disintegrating tablet DISSOLVE 1 TABLET ON THE TONGUE EVERY 8 HOURS AS NEEDED FOR NAUSEA OR VOMITING. 10/12/22  Yes Peter Higgins DO   pantoprazole (PROTONIX) 40 MG EC tablet Take 1 tablet by mouth  Every Morning. 2/1/24  Yes Peter Higgins DO   rOPINIRole (REQUIP) 1 MG tablet Take 1 tablet by mouth Daily. 2/7/23  Yes Peter Higgins DO        Patient Active Problem List   Diagnosis    Uterine leiomyoma    Acquired absence of bilateral breasts and nipples    Anxiety    Malignant neoplasm of breast    Abnormal cardiovascular stress test    Coronary artery disease due to lipid rich plaque    Depressive disorder    Dysfunctional uterine bleeding    Electrocardiogram abnormal    Esophageal dysmotility    Hypertension    Estrogen receptor positive tumor status    Primary gout    Menopause    Migraine    Obesity with body mass index 30 or greater    Restless leg syndrome    Sleep pattern disturbance    Diabetes mellitus    Hallux valgus    Other hyperlipidemia    Body mass index (BMI) of 36.0 to 36.9 in adult        Past Surgical History:   Procedure Laterality Date    BREAST SURGERY  2006    Double Mastectomy    COSMETIC SURGERY  2007    Breast reconstruction    DILATATION AND CURETTAGE      X2    DILATATION AND CURETTAGE N/A 01/29/2018    Procedure: DILATATION AND CURETTAGE;  Surgeon: Moriah Schmitz MD;  Location: [facility];  Service:     ENDOMETRIAL ABLATION      MASTECTOMY      WITH BREAST RECONSTRUCITON    MASTECTOMY Bilateral     TOTAL LAPAROSCOPIC HYSTERECTOMY Bilateral 03/12/2018    Procedure: TOTAL HYSTERECTOMY BILATERAL SALPINGECTOMY BILATERAL SALPINGO-OOPHORECTOMY DA CHERY ROBOT;  Surgeon: Karley Banda MD;  Location: [facility];  Service: DaVinci       Social History     Socioeconomic History    Marital status:    Tobacco Use    Smoking status: Never     Passive exposure: Never    Smokeless tobacco: Never   Vaping Use    Vaping status: Never Used   Substance and Sexual Activity    Alcohol use: Yes     Alcohol/week: 1.0 standard drink of alcohol     Types: 1 Glasses of wine per week     Comment: About one time per month    Drug use: No    Sexual activity: Defer  "      Family History   Problem Relation Age of Onset    Colon cancer Sister     Cancer Sister         Colon/Liver    Diabetes Sister     Heart disease Father     Malig Hyperthermia Neg Hx        Family history, surgical history, past medical history, Allergies and meds reviewed with patient today and updated in [facility] EMR.     ROS:  Review of Systems   Constitutional:  Negative for fatigue and unexpected weight loss.   Eyes:  Negative for blurred vision and visual disturbance.   Respiratory:  Negative for cough, chest tightness, shortness of breath and wheezing.    Cardiovascular:  Negative for chest pain and palpitations.   Endocrine: Negative for polydipsia and polyuria.   Neurological:  Negative for tremors, speech difficulty, headache and confusion.   Psychiatric/Behavioral:  Positive for depressed mood. The patient is not nervous/anxious.        OBJECTIVE:  Vitals:    03/12/24 1110   BP: 114/66   Pulse: 73   Temp: 97.6 °F (36.4 °C)   TempSrc: Temporal   SpO2: 97%   Weight: 116 kg (256 lb 12.8 oz)   Height: 175.3 cm (69\")     No results found.   Body mass index is 37.92 kg/m².  No LMP recorded. Patient has had a hysterectomy.    The ASCVD Risk score ([place] DK, et al., 2019) failed to calculate for the following reasons:    The patient has a prior MI or stroke diagnosis     Physical Exam  Vitals and nursing note reviewed.   Constitutional:       General: She is not in acute distress.     Appearance: Normal appearance. She is obese.   HENT:      Head: Normocephalic.      Right Ear: Tympanic membrane, ear canal and external ear normal.      Left Ear: Tympanic membrane, ear canal and external ear normal.      Nose: Nose normal.      Mouth/Throat:      Mouth: Mucous membranes are moist.      Pharynx: Oropharynx is clear.   Eyes:      General: No scleral icterus.     Conjunctiva/sclera: Conjunctivae normal.      Pupils: Pupils are equal, round, and reactive to light.   Cardiovascular:      Rate and Rhythm: Normal rate " and regular rhythm.      Pulses: Normal pulses.      Heart sounds: Normal heart sounds. No murmur heard.  Pulmonary:      Effort: Pulmonary effort is normal.      Breath sounds: Normal breath sounds. No wheezing, rhonchi or rales.   Musculoskeletal:      Cervical back: Neck supple. No rigidity or tenderness.   Lymphadenopathy:      Cervical: No cervical adenopathy.   Skin:     General: Skin is warm and dry.      Coloration: Skin is not jaundiced.      Findings: No rash.   Neurological:      General: No focal deficit present.      Mental Status: She is alert and oriented to person, place, and time.   Psychiatric:         Mood and Affect: Mood normal.         Thought Content: Thought content normal.         Judgment: Judgment normal.         Procedures    No visits with results within 30 Day(s) from this visit.   Latest known visit with results is:   Office Visit on 09/12/2023   Component Date Value Ref Range Status    Glucose 09/12/2023 125 (H)  65 - 99 mg/dL Final    BUN 09/12/2023 14  8 - 23 mg/dL Final    Creatinine 09/12/2023 0.91  0.57 - 1.00 mg/dL Final    Sodium 09/12/2023 139  136 - 145 mmol/L Final    Potassium 09/12/2023 4.4  3.5 - 5.2 mmol/L Final    Chloride 09/12/2023 101  98 - 107 mmol/L Final    CO2 09/12/2023 28.1  22.0 - 29.0 mmol/L Final    Calcium 09/12/2023 9.3  8.6 - 10.5 mg/dL Final    Total Protein 09/12/2023 7.2  6.0 - 8.5 g/dL Final    Albumin 09/12/2023 4.0  3.5 - 5.2 g/dL Final    ALT (SGPT) 09/12/2023 12  1 - 33 U/L Final    AST (SGOT) 09/12/2023 16  1 - 32 U/L Final    Alkaline Phosphatase 09/12/2023 144 (H)  39 - 117 U/L Final    Total Bilirubin 09/12/2023 0.5  0.0 - 1.2 mg/dL Final    Globulin 09/12/2023 3.2  gm/dL Final    A/G Ratio 09/12/2023 1.3  g/dL Final    BUN/Creatinine Ratio 09/12/2023 15.4  7.0 - 25.0 Final    Anion Gap 09/12/2023 9.9  5.0 - 15.0 mmol/L Final    eGFR 09/12/2023 70.2  >60.0 mL/min/1.73 Final    Total Cholesterol 09/12/2023 119  0 - 200 mg/dL Final     Triglycerides 09/12/2023 67  0 - 150 mg/dL Final    HDL Cholesterol 09/12/2023 52  40 - 60 mg/dL Final    LDL Cholesterol  09/12/2023 53  0 - 100 mg/dL Final    VLDL Cholesterol 09/12/2023 14  5 - 40 mg/dL Final    LDL/HDL Ratio 09/12/2023 1.03   Final    Hemoglobin A1C 09/12/2023 6.70 (H)  4.80 - 5.60 % Final       ASSESSMENT/ PLAN:    Diagnoses and all orders for this visit:    1. Other hyperlipidemia (Primary)  Assessment & Plan:   Will update her lipid profile with her labs.    Orders:  -     Comprehensive Metabolic Panel  -     Lipid Panel    2. Primary hypertension  Assessment & Plan:  Her blood pressure is good here today.  Will continue her current meds and update her labs.    Orders:  -     Comprehensive Metabolic Panel  -     Lipid Panel    3. Type 2 diabetes mellitus without complication, without long-term current use of insulin  Assessment & Plan:  Update her A1c with all of her routine labs.    Orders:  -     Comprehensive Metabolic Panel  -     Lipid Panel  -     Hemoglobin A1c  -     Microalbumin / Creatinine Urine Ratio - Urine, Clean Catch    4. Coronary artery disease due to lipid rich plaque  Assessment & Plan:  She denies any current anginal-like complaints.      5. Screening for colon cancer  -     Ambulatory Referral For Screening Colonoscopy    Other orders  -     Pneumococcal Conjugate Vaccine 20-Valent All               Orders Placed Today:     No orders of the defined types were placed in this encounter.       Management Plan:     An After Visit Summary was printed and given to the patient at discharge.    Follow-up: No follow-ups on file.    Peter Higgins DO 3/12/2024 11:44 EDT  This note was electronically signed.  Answers submitted by the patient for this visit:  Primary Reason for Visit (Submitted on 3/11/2024)  What is the primary reason for your visit?: Diabetes

## 2024-03-19 ENCOUNTER — TELEPHONE (OUTPATIENT)
Dept: GASTROENTEROLOGY | Facility: CLINIC | Age: 69
End: 2024-03-19
Payer: MEDICARE

## 2024-03-19 NOTE — TELEPHONE ENCOUNTER
"Contacted the patient and verified her information then advised that I was calling due to the referral we received from Dr. Higgins for \"Screening for colon cancer\". Patient verbalized understanding and was advised that she was overdue for her 3 year colon recall since her last scope was on 05/02/19, patient was also advised on screening call process since she stated she wasn't having any other symptoms and then scheduled for the next available recall date with Dr. Tanner.   "

## 2024-04-03 RX ORDER — DULAGLUTIDE 3 MG/.5ML
3 INJECTION, SOLUTION SUBCUTANEOUS WEEKLY
Qty: 6 ML | Refills: 1 | Status: SHIPPED | OUTPATIENT
Start: 2024-04-03

## 2024-04-26 ENCOUNTER — CLINICAL SUPPORT (OUTPATIENT)
Dept: GASTROENTEROLOGY | Facility: CLINIC | Age: 69
End: 2024-04-26
Payer: MEDICARE

## 2024-04-26 NOTE — PROGRESS NOTES
Ifeoma Stout  1955  68 y.o.    Reason for call 3 year recall, 2 years overdue  Prep prescribed: Plenvu  Prep instructions reviewed with patient and sent to patient via regular mail to the home address on file  Is the patient currently on any injectable or oral medications for weight loss or diabetes? No  Clearance needed? Yes  If yes, what clearance is needed? Cardiology  Clearance has been requested from [facility]  The patient has been scheduled for: Colonoscopy  After your procedure, you will be contacted with results. Please confirm the best phone # to reach the patient: [phone]  Family history of colon cancer? Yes  If yes, indicate relative: Sister  Family History   Problem Relation Age of Onset    Colon cancer Sister     Cancer Sister         Colon/Liver    Diabetes Sister     Heart disease Father     Malig Hyperthermia Neg Hx      Past Medical History:   Diagnosis Date    Colon cancer screening 05/02/2019    polyps in proximal descending colon and 3mm polyp in distal rectum  Recommended follow up colonoscopy in 3 years  Dr Tanner    Diabetes mellitus 07/20/2020    GERD (gastroesophageal reflux disease) 2017    History of chemotherapy     LAST TREATMENT APRIL 2007    History of staph infection     S/P MASTECTOMY RT BREAST    HL (hearing loss) 2020    Hypertension     Migraine     Post-menopausal bleeding     Uterine fibroid      Allergies   Allergen Reactions    Dapagliflozin Rash     Other reaction(s): Rash      Adhesive Tape Rash     Past Surgical History:   Procedure Laterality Date    BREAST SURGERY  2006    Double Mastectomy    COSMETIC SURGERY  2007    Breast reconstruction    DILATATION AND CURETTAGE      X2    DILATATION AND CURETTAGE N/A 01/29/2018    Procedure: DILATATION AND CURETTAGE;  Surgeon: Moriah Schmitz MD;  Location: [facility];  Service:     ENDOMETRIAL ABLATION      MASTECTOMY      WITH BREAST RECONSTRUCITON    MASTECTOMY Bilateral     TOTAL LAPAROSCOPIC HYSTERECTOMY Bilateral  03/12/2018    Procedure: TOTAL HYSTERECTOMY BILATERAL SALPINGECTOMY BILATERAL SALPINGO-OOPHORECTOMY DA CHERY ROBOT;  Surgeon: Karley Banda MD;  Location: [facility];  Service: [facility]     Social History     Socioeconomic History    Marital status:    Tobacco Use    Smoking status: Never     Passive exposure: Never    Smokeless tobacco: Never   Vaping Use    Vaping status: Never Used   Substance and Sexual Activity    Alcohol use: Yes     Alcohol/week: 1.0 standard drink of alcohol     Types: 1 Glasses of wine per week     Comment: About one time per month    Drug use: No    Sexual activity: Defer       Current Outpatient Medications:     allopurinol (ZYLOPRIM) 300 MG tablet, Take 1 tablet by mouth Daily., Disp: 90 tablet, Rfl: 3    ALPRAZolam (XANAX) 0.5 MG tablet, TAKE ONE TABLET BY MOUTH TWICE A DAY AS NEEDED FOR ANXIETY, Disp: 30 tablet, Rfl: 1    aspirin 81 MG chewable tablet, Chew 1 tablet Daily., Disp: , Rfl:     atorvastatin (LIPITOR) 40 MG tablet, Take 1 tablet by mouth Every Night., Disp: 90 tablet, Rfl: 3    cetirizine (zyrTEC) 10 MG tablet, Take 1 tablet by mouth Daily., Disp: , Rfl:     Dulaglutide (Trulicity) 3 MG/0.5ML solution pen-injector, Inject 0.5 mL under the skin into the appropriate area as directed 1 (One) Time Per Week., Disp: 6 mL, Rfl: 1    lisinopril-hydrochlorothiazide (PRINZIDE,ZESTORETIC) 10-12.5 MG per tablet, Take 1 tablet by mouth Daily., Disp: 90 tablet, Rfl: 3    metoprolol succinate XL (TOPROL-XL) 100 MG 24 hr tablet, Take 1 tablet by mouth Daily., Disp: 90 tablet, Rfl: 3    Multiple Vitamins-Minerals (MULTIVITAMIN ADULTS PO), Take 1 tablet by mouth Daily., Disp: , Rfl:     ondansetron ODT (ZOFRAN-ODT) 4 MG disintegrating tablet, DISSOLVE 1 TABLET ON THE TONGUE EVERY 8 HOURS AS NEEDED FOR NAUSEA OR VOMITING., Disp: 10 tablet, Rfl: 0    pantoprazole (PROTONIX) 40 MG EC tablet, Take 1 tablet by mouth Every Morning., Disp: 90 tablet, Rfl: 3    rOPINIRole (REQUIP) 1  MG tablet, Take 1 tablet by mouth Daily., Disp: 90 tablet, Rfl: 3

## 2024-04-29 ENCOUNTER — PREP FOR SURGERY (OUTPATIENT)
Dept: OTHER | Facility: HOSPITAL | Age: 69
End: 2024-04-29
Payer: MEDICARE

## 2024-04-29 DIAGNOSIS — Z12.11 ENCOUNTER FOR SCREENING FOR MALIGNANT NEOPLASM OF COLON: Primary | ICD-10-CM

## 2024-04-29 DIAGNOSIS — Z86.010 HISTORY OF COLON POLYPS: ICD-10-CM

## 2024-05-02 ENCOUNTER — TELEPHONE (OUTPATIENT)
Dept: GASTROENTEROLOGY | Facility: CLINIC | Age: 69
End: 2024-05-02
Payer: MEDICARE

## 2024-05-02 ENCOUNTER — PREP FOR SURGERY (OUTPATIENT)
Dept: OTHER | Facility: HOSPITAL | Age: 69
End: 2024-05-02
Payer: MEDICARE

## 2024-05-02 PROBLEM — Z86.010 HISTORY OF COLON POLYPS: Status: ACTIVE | Noted: 2024-04-29

## 2024-05-02 PROBLEM — Z12.11 ENCOUNTER FOR SCREENING FOR MALIGNANT NEOPLASM OF COLON: Status: ACTIVE | Noted: 2024-04-29

## 2024-05-02 PROBLEM — Z86.0100 HISTORY OF COLON POLYPS: Status: ACTIVE | Noted: 2024-04-29

## 2024-06-20 ENCOUNTER — TELEPHONE (OUTPATIENT)
Dept: GASTROENTEROLOGY | Facility: CLINIC | Age: 69
End: 2024-06-20
Payer: MEDICARE

## 2024-06-20 NOTE — TELEPHONE ENCOUNTER
Cardiac clearance request was sent to 's office.     Called office to confirm fax number [phone].

## 2024-06-25 RX ORDER — ROPINIROLE 1 MG/1
1 TABLET, FILM COATED ORAL DAILY
Qty: 90 TABLET | Refills: 3 | Status: SHIPPED | OUTPATIENT
Start: 2024-06-25

## 2024-07-16 NOTE — PRE-PROCEDURE INSTRUCTIONS
"Instructed on date and arrival time of 0600. Come to entrance \"C\". Must have  over age 18 to drive home.  May have two visitors; however, children under 12 must stay in waiting room.  Discussed clear liquid diet (no red or purple), bowel prep, and NPO.  May take medications as usual except for blood thinners, diabetic medications, and weight loss medications.  Verbalized understanding of instructions given.  Instructed to call for questions or concerns.  Hold Trulicity for one week prior to procedure.  Cardiac clearance noted in chart.  "

## 2024-07-22 ENCOUNTER — ANESTHESIA EVENT (OUTPATIENT)
Dept: GASTROENTEROLOGY | Facility: HOSPITAL | Age: 69
End: 2024-07-22
Payer: MEDICARE

## 2024-07-22 NOTE — ANESTHESIA PREPROCEDURE EVALUATION
Anesthesia Evaluation     Patient summary reviewed and Nursing notes reviewed   history of anesthetic complications:  PONV  NPO Solid Status: > 8 hours  NPO Liquid Status: > 4 hours           Airway   Mallampati: II  TM distance: <3 FB  Neck ROM: full  No difficulty expected, Small opening and Large neck circumference  Dental - normal exam     Pulmonary     breath sounds clear to auscultation  Cardiovascular   Exercise tolerance: good (4-7 METS)    ECG reviewed  Patient on routine beta blocker  Rhythm: regular  Rate: normal    (+) hypertension well controlled, CAD, hyperlipidemia      Neuro/Psych  (+) headaches, psychiatric history Anxiety and Depression  GI/Hepatic/Renal/Endo    (+) morbid obesity, GERD well controlled, diabetes mellitus type 2 well controlled    Musculoskeletal     Abdominal    Substance History      OB/GYN          Other      history of cancer (breast CA- bilateral mastectomy - lymph nodes removed on left)    ROS/Med Hx Other: Trulicity--last dose - 7/14    EKG 01/2018  SINUS RHYTHM  ABNRM R PROG, CONSIDER ASMI OR LEAD PLACEMENT  NONSPECIFIC T ABNORMALITIES, LATERAL LEADS  BORDERLINE PROLONGED QT INTERVAL  NO PRIOR TRACING AVAILABLE FOR COMPARISON  Electronically Signed by:  Edna Marquez ([facility]) 25-Jan-2018 16:14:27  Date and Time of Study: 2018-01-25 14:46:25        Phys Exam Other: Zofran 4 mg IV in preop for PONV               Anesthesia Plan    ASA 3     general   total IV anesthesia  (Total IV Anesthesia    Patient understands anesthesia not responsible for dental damage.  )  intravenous induction     Anesthetic plan, risks, benefits, and alternatives have been provided, discussed and informed consent has been obtained with: patient and spouse/significant other.  Pre-procedure education provided  Plan discussed with CRNA.      CODE STATUS:

## 2024-07-23 ENCOUNTER — ANESTHESIA (OUTPATIENT)
Dept: GASTROENTEROLOGY | Facility: HOSPITAL | Age: 69
End: 2024-07-23
Payer: MEDICARE

## 2024-07-23 ENCOUNTER — HOSPITAL ENCOUNTER (OUTPATIENT)
Facility: HOSPITAL | Age: 69
Setting detail: HOSPITAL OUTPATIENT SURGERY
Discharge: HOME | End: 2024-07-23
Attending: INTERNAL MEDICINE | Admitting: INTERNAL MEDICINE
Payer: MEDICARE

## 2024-07-23 VITALS
TEMPERATURE: 96.3 F | WEIGHT: 253.09 LBS | HEART RATE: 67 BPM | OXYGEN SATURATION: 96 % | BODY MASS INDEX: 37.37 KG/M2 | RESPIRATION RATE: 15 BRPM | SYSTOLIC BLOOD PRESSURE: 110 MMHG | DIASTOLIC BLOOD PRESSURE: 55 MMHG

## 2024-07-23 DIAGNOSIS — Z86.010 HISTORY OF COLON POLYPS: ICD-10-CM

## 2024-07-23 DIAGNOSIS — Z12.11 ENCOUNTER FOR SCREENING FOR MALIGNANT NEOPLASM OF COLON: ICD-10-CM

## 2024-07-23 LAB — GLUCOSE BLDC GLUCOMTR-MCNC: 165 MG/DL (ref 70–99)

## 2024-07-23 PROCEDURE — 82948 REAGENT STRIP/BLOOD GLUCOSE: CPT | Performed by: NURSE ANESTHETIST, CERTIFIED REGISTERED

## 2024-07-23 PROCEDURE — 88305 TISSUE EXAM BY PATHOLOGIST: CPT | Performed by: INTERNAL MEDICINE

## 2024-07-23 PROCEDURE — 25010000002 ONDANSETRON PER 1 MG

## 2024-07-23 PROCEDURE — 25810000003 LACTATED RINGERS PER 1000 ML: Performed by: NURSE ANESTHETIST, CERTIFIED REGISTERED

## 2024-07-23 PROCEDURE — 25010000002 PROPOFOL 10 MG/ML EMULSION: Performed by: NURSE ANESTHETIST, CERTIFIED REGISTERED

## 2024-07-23 PROCEDURE — 45385 COLONOSCOPY W/LESION REMOVAL: CPT | Performed by: INTERNAL MEDICINE

## 2024-07-23 RX ORDER — ONDANSETRON 2 MG/ML
4 INJECTION INTRAMUSCULAR; INTRAVENOUS ONCE
Status: COMPLETED | OUTPATIENT
Start: 2024-07-23 | End: 2024-07-23

## 2024-07-23 RX ORDER — EPHEDRINE SULFATE 50 MG/ML
INJECTION, SOLUTION INTRAVENOUS AS NEEDED
Status: DISCONTINUED | OUTPATIENT
Start: 2024-07-23 | End: 2024-07-23 | Stop reason: SURG

## 2024-07-23 RX ORDER — SODIUM CHLORIDE, SODIUM LACTATE, POTASSIUM CHLORIDE, CALCIUM CHLORIDE 600; 310; 30; 20 MG/100ML; MG/100ML; MG/100ML; MG/100ML
30 INJECTION, SOLUTION INTRAVENOUS CONTINUOUS
Status: DISCONTINUED | OUTPATIENT
Start: 2024-07-23 | End: 2024-07-23 | Stop reason: HOSPADM

## 2024-07-23 RX ORDER — PROPOFOL 10 MG/ML
VIAL (ML) INTRAVENOUS AS NEEDED
Status: DISCONTINUED | OUTPATIENT
Start: 2024-07-23 | End: 2024-07-23 | Stop reason: SURG

## 2024-07-23 RX ORDER — LIDOCAINE HYDROCHLORIDE 20 MG/ML
INJECTION, SOLUTION EPIDURAL; INFILTRATION; INTRACAUDAL; PERINEURAL AS NEEDED
Status: DISCONTINUED | OUTPATIENT
Start: 2024-07-23 | End: 2024-07-23 | Stop reason: SURG

## 2024-07-23 RX ADMIN — ONDANSETRON 4 MG: 2 INJECTION INTRAMUSCULAR; INTRAVENOUS at 07:32

## 2024-07-23 RX ADMIN — PROPOFOL 80 MG: 10 INJECTION, EMULSION INTRAVENOUS at 07:52

## 2024-07-23 RX ADMIN — SODIUM CHLORIDE, POTASSIUM CHLORIDE, SODIUM LACTATE AND CALCIUM CHLORIDE 30 ML/HR: 600; 310; 30; 20 INJECTION, SOLUTION INTRAVENOUS at 06:59

## 2024-07-23 RX ADMIN — EPHEDRINE SULFATE 5 MG: 50 INJECTION INTRAVENOUS at 08:13

## 2024-07-23 RX ADMIN — LIDOCAINE HYDROCHLORIDE 50 MG: 20 INJECTION, SOLUTION INTRAVENOUS at 07:52

## 2024-07-23 RX ADMIN — PROPOFOL 200 MCG/KG/MIN: 10 INJECTION, EMULSION INTRAVENOUS at 07:52

## 2024-07-23 RX ADMIN — EPHEDRINE SULFATE 5 MG: 50 INJECTION INTRAVENOUS at 08:20

## 2024-07-23 NOTE — ANESTHESIA POSTPROCEDURE EVALUATION
Patient: Ifeoma Stout    Procedure Summary       Date: 07/23/24 Room / Location: [facility] ENDOSCOPY 4 / [facility] ENDOSCOPY    Anesthesia Start: 0747 Anesthesia Stop: 0827    Procedure: COLONOSCOPY W/ COLD SNARE POLYPECTOMY Diagnosis:       Encounter for screening for malignant neoplasm of colon      History of colon polyps      (Encounter for screening for malignant neoplasm of colon [Z12.11])      (History of colon polyps [Z86.010])    Surgeons: Kuldip Tanner MD Provider: Magaly Hawley CRNA    Anesthesia Type: general ASA Status: 3            Anesthesia Type: general    Vitals  Vitals Value Taken Time   /49 07/23/24 0836   Temp 35.7 °C (96.3 °F) 07/23/24 0826   Pulse 69 07/23/24 0839   Resp 12 07/23/24 0830   SpO2 97 % 07/23/24 0839   Vitals shown include unfiled device data.        Post Anesthesia Care and Evaluation    Post-procedure mental status: acceptable.  Pain management: satisfactory to patient    Airway patency: patent  Anesthetic complications: No anesthetic complications    Cardiovascular status: acceptable  Respiratory status: acceptable    Comments: Per chart review

## 2024-07-23 NOTE — H&P
Pre Procedure History & Physical    Chief Complaint:   Past history colon polyps    Subjective     HPI:   Past history of colon polyp    Past Medical History:   Past Medical History:   Diagnosis Date    Colon cancer screening 05/02/2019    polyps in proximal descending colon and 3mm polyp in distal rectum  Recommended follow up colonoscopy in 3 years  Dr Tanner    Diabetes mellitus 07/20/2020    GERD (gastroesophageal reflux disease) 2017    History of chemotherapy     LAST TREATMENT APRIL 2007    History of staph infection     S/P MASTECTOMY RT BREAST    HL (hearing loss) 2020    Hypertension     Migraine     Post-menopausal bleeding     Uterine fibroid        Past Surgical History:  Past Surgical History:   Procedure Laterality Date    BREAST SURGERY  2006    Double Mastectomy    COSMETIC SURGERY  2007    Breast reconstruction    DILATATION AND CURETTAGE      X2    DILATATION AND CURETTAGE N/A 01/29/2018    Procedure: DILATATION AND CURETTAGE;  Surgeon: Moriah Schmitz MD;  Location: [facility];  Service:     ENDOMETRIAL ABLATION      MASTECTOMY      WITH BREAST RECONSTRUCITON    MASTECTOMY Bilateral     TOTAL LAPAROSCOPIC HYSTERECTOMY Bilateral 03/12/2018    Procedure: TOTAL HYSTERECTOMY BILATERAL SALPINGECTOMY BILATERAL SALPINGO-OOPHORECTOMY DA CHERY ROBOT;  Surgeon: Karley Banda MD;  Location: [facility];  Service: DaVinci       Family History:  Family History   Problem Relation Age of Onset    Colon cancer Sister     Cancer Sister         Colon/Liver    Diabetes Sister     Heart disease Father     Malig Hyperthermia Neg Hx        Social History:   reports that she has never smoked. She has never been exposed to tobacco smoke. She has never used smokeless tobacco. She reports current alcohol use of about 1.0 standard drink of alcohol per week. She reports that she does not use drugs.    Medications:   Medications Prior to Admission   Medication Sig Dispense Refill Last Dose    allopurinol  (ZYLOPRIM) 300 MG tablet Take 1 tablet by mouth Daily. 90 tablet 3 7/22/2024    ALPRAZolam (XANAX) 0.5 MG tablet TAKE ONE TABLET BY MOUTH TWICE A DAY AS NEEDED FOR ANXIETY 30 tablet 1 Past Week    aspirin 81 MG chewable tablet Chew 1 tablet Daily.   7/22/2024    atorvastatin (LIPITOR) 40 MG tablet Take 1 tablet by mouth Every Night. 90 tablet 3 7/22/2024    cetirizine (zyrTEC) 10 MG tablet Take 1 tablet by mouth Daily.   7/22/2024    lisinopril-hydrochlorothiazide (PRINZIDE,ZESTORETIC) 10-12.5 MG per tablet Take 1 tablet by mouth Daily. 90 tablet 3 7/22/2024    metoprolol succinate XL (TOPROL-XL) 100 MG 24 hr tablet Take 1 tablet by mouth Daily. 90 tablet 3 7/22/2024    ondansetron ODT (ZOFRAN-ODT) 4 MG disintegrating tablet DISSOLVE 1 TABLET ON THE TONGUE EVERY 8 HOURS AS NEEDED FOR NAUSEA OR VOMITING. 10 tablet 0 Past Month    pantoprazole (PROTONIX) 40 MG EC tablet Take 1 tablet by mouth Every Morning. 90 tablet 3 7/22/2024    Dulaglutide (Trulicity) 3 MG/0.5ML solution pen-injector Inject 0.5 mL under the skin into the appropriate area as directed 1 (One) Time Per Week. 6 mL 1 7/14/2024    rOPINIRole (REQUIP) 1 MG tablet TAKE ONE TABLET BY MOUTH DAILY 90 tablet 3 More than a month       Allergies:  Dapagliflozin and Adhesive tape        Objective     Blood pressure 125/64, pulse 76, temperature 97.9 °F (36.6 °C), temperature source Temporal, resp. rate 18, weight 115 kg (253 lb 1.4 oz), SpO2 96%, not currently breastfeeding.    Physical Exam   Constitutional: Pt is oriented to person, place, and time and well-developed, well-nourished, and in no distress.   Mouth/Throat: Oropharynx is clear and moist.   Neck: Normal range of motion.   Cardiovascular: Normal rate, regular rhythm and normal heart sounds.    Pulmonary/Chest: Effort normal and breath sounds normal.   Abdominal: Soft. Nontender  Skin: Skin is warm and dry.   Psychiatric: Mood, memory, affect and judgment normal.     Assessment & Plan      Diagnosis:  Surveillance for colon polyps    Anticipated Surgical Procedure:  Colonoscopy    The risks, benefits, and alternatives of this procedure have been discussed with the patient or the responsible party- the patient understands and agrees to proceed.

## 2024-07-24 LAB
CYTO UR: NORMAL
LAB AP CASE REPORT: NORMAL
LAB AP CLINICAL INFORMATION: NORMAL
PATH REPORT.FINAL DX SPEC: NORMAL
PATH REPORT.GROSS SPEC: NORMAL

## 2024-07-25 RX ORDER — ATORVASTATIN CALCIUM 40 MG/1
40 TABLET, FILM COATED ORAL NIGHTLY
Qty: 90 TABLET | Refills: 3 | Status: SHIPPED | OUTPATIENT
Start: 2024-07-25

## 2024-07-26 ENCOUNTER — TELEPHONE (OUTPATIENT)
Dept: GASTROENTEROLOGY | Facility: CLINIC | Age: 69
End: 2024-07-26
Payer: MEDICARE

## 2024-07-26 NOTE — TELEPHONE ENCOUNTER
Caller: Ifeoma Stout    Relationship: Self    Best call back number: [phone]     Caller requesting test results: PATIENT    What test was performed: COLONOSCOPY     When was the test performed: 7/23    Where was the test performed: OUTSIDE FACILITY     Additional notes: PATIENT HAD A COLONOSCOPY WITH DR BORREGO ON 7/23 AND THEY WOULD LIKE TO DISCUSS RESULTS. PLEASE CALL PATIENT.

## 2024-09-10 RX ORDER — ALPRAZOLAM 0.5 MG
TABLET ORAL
Qty: 30 TABLET | Refills: 0 | Status: SHIPPED | OUTPATIENT
Start: 2024-09-10

## 2024-09-10 RX ORDER — ALLOPURINOL 300 MG/1
300 TABLET ORAL DAILY
Qty: 90 TABLET | Refills: 3 | Status: SHIPPED | OUTPATIENT
Start: 2024-09-10

## 2024-09-10 NOTE — TELEPHONE ENCOUNTER
Lov 03/12/2024  Nov 10/11/2024  Uds 04/27/2023   Oxybutynin Counseling:  I discussed with the patient the risks of oxybutynin including but not limited to skin rash, drowsiness, dry mouth, difficulty urinating, and blurred vision.

## 2024-10-11 ENCOUNTER — OFFICE VISIT (OUTPATIENT)
Dept: FAMILY MEDICINE CLINIC | Facility: CLINIC | Age: 69
End: 2024-10-11
Payer: MEDICARE

## 2024-10-11 VITALS
HEART RATE: 84 BPM | TEMPERATURE: 98 F | BODY MASS INDEX: 37.18 KG/M2 | SYSTOLIC BLOOD PRESSURE: 117 MMHG | OXYGEN SATURATION: 96 % | DIASTOLIC BLOOD PRESSURE: 62 MMHG | WEIGHT: 251 LBS | HEIGHT: 69 IN

## 2024-10-11 DIAGNOSIS — E78.49 OTHER HYPERLIPIDEMIA: Primary | ICD-10-CM

## 2024-10-11 DIAGNOSIS — I25.10 CORONARY ARTERY DISEASE DUE TO LIPID RICH PLAQUE: ICD-10-CM

## 2024-10-11 DIAGNOSIS — Z00.00 MEDICARE ANNUAL WELLNESS VISIT, SUBSEQUENT: ICD-10-CM

## 2024-10-11 DIAGNOSIS — E11.9 TYPE 2 DIABETES MELLITUS WITHOUT COMPLICATION, WITHOUT LONG-TERM CURRENT USE OF INSULIN: ICD-10-CM

## 2024-10-11 DIAGNOSIS — I10 PRIMARY HYPERTENSION: ICD-10-CM

## 2024-10-11 DIAGNOSIS — I25.83 CORONARY ARTERY DISEASE DUE TO LIPID RICH PLAQUE: ICD-10-CM

## 2024-10-11 LAB
ALBUMIN SERPL-MCNC: 4.1 G/DL (ref 3.5–5.2)
ALBUMIN UR-MCNC: 2.7 MG/DL
ALBUMIN/GLOB SERPL: 1.3 G/DL
ALP SERPL-CCNC: 168 U/L (ref 39–117)
ALT SERPL W P-5'-P-CCNC: 20 U/L (ref 1–33)
ANION GAP SERPL CALCULATED.3IONS-SCNC: 12 MMOL/L (ref 5–15)
AST SERPL-CCNC: 17 U/L (ref 1–32)
BILIRUB SERPL-MCNC: 0.4 MG/DL (ref 0–1.2)
BUN SERPL-MCNC: 13 MG/DL (ref 8–23)
BUN/CREAT SERPL: 16.3 (ref 7–25)
CALCIUM SPEC-SCNC: 9.7 MG/DL (ref 8.6–10.5)
CHLORIDE SERPL-SCNC: 99 MMOL/L (ref 98–107)
CHOLEST SERPL-MCNC: 119 MG/DL (ref 0–200)
CO2 SERPL-SCNC: 29 MMOL/L (ref 22–29)
CREAT SERPL-MCNC: 0.8 MG/DL (ref 0.57–1)
CREAT UR-MCNC: 379.5 MG/DL
EGFRCR SERPLBLD CKD-EPI 2021: 81.4 ML/MIN/1.73
GLOBULIN UR ELPH-MCNC: 3.2 GM/DL
GLUCOSE SERPL-MCNC: 111 MG/DL (ref 65–99)
HBA1C MFR BLD: 6.4 % (ref 4.8–5.6)
HDLC SERPL-MCNC: 49 MG/DL (ref 40–60)
LDLC SERPL CALC-MCNC: 51 MG/DL (ref 0–100)
LDLC/HDLC SERPL: 1.01 {RATIO}
MICROALBUMIN/CREAT UR: 7.1 MG/G (ref 0–29)
POTASSIUM SERPL-SCNC: 3.8 MMOL/L (ref 3.5–5.2)
PROT SERPL-MCNC: 7.3 G/DL (ref 6–8.5)
SODIUM SERPL-SCNC: 140 MMOL/L (ref 136–145)
TRIGL SERPL-MCNC: 103 MG/DL (ref 0–150)
VLDLC SERPL-MCNC: 19 MG/DL (ref 5–40)

## 2024-10-11 PROCEDURE — 82570 ASSAY OF URINE CREATININE: CPT | Performed by: FAMILY MEDICINE

## 2024-10-11 PROCEDURE — 83036 HEMOGLOBIN GLYCOSYLATED A1C: CPT | Performed by: FAMILY MEDICINE

## 2024-10-11 PROCEDURE — 80053 COMPREHEN METABOLIC PANEL: CPT | Performed by: FAMILY MEDICINE

## 2024-10-11 PROCEDURE — 80061 LIPID PANEL: CPT | Performed by: FAMILY MEDICINE

## 2024-10-11 PROCEDURE — 82043 UR ALBUMIN QUANTITATIVE: CPT | Performed by: FAMILY MEDICINE

## 2024-10-11 RX ORDER — SUMATRIPTAN 50 MG/1
TABLET, FILM COATED ORAL
Qty: 9 TABLET | Refills: 3 | Status: SHIPPED | OUTPATIENT
Start: 2024-10-11

## 2024-10-11 NOTE — ASSESSMENT & PLAN NOTE
She denies any current anginal complaints.  We did discuss switching her Trulicity to Ozempic because of the cardiovascular benefits.  She does not want to do that at this time.

## 2024-10-11 NOTE — ASSESSMENT & PLAN NOTE
Her Medicare wellness today is otherwise good.  We did discuss vaccines such as her Tdap and pneumococcal influenza shingles and RSV.  She declines those at this time.

## 2024-10-11 NOTE — PROGRESS NOTES
Subjective   The ABCs of the Annual Wellness Visit  Medicare Wellness Visit      Ifeoma Stout is a 68 y.o. patient who presents for a Medicare Wellness Visit.    The following portions of the patient's history were reviewed and   updated as appropriate: allergies, current medications, past family history, past medical history, past social history, past surgical history, and problem list.    Compared to one year ago, the patient's physical   health is better.  Compared to one year ago, the patient's mental   health is better.    Recent Hospitalizations:  She was not admitted to the hospital during the last year.     Current Medical Providers:  Patient Care Team:  Peter Higgins DO as PCP - General (Family Medicine)    Outpatient Medications Prior to Visit   Medication Sig Dispense Refill    allopurinol (ZYLOPRIM) 300 MG tablet TAKE 1 TABLET BY MOUTH DAILY 90 tablet 3    ALPRAZolam (XANAX) 0.5 MG tablet TAKE ONE TABLET BY MOUTH TWICE A DAY AS NEEDED FOR ANXIETY 30 tablet 0    aspirin 81 MG chewable tablet Chew 1 tablet Daily.      atorvastatin (LIPITOR) 40 MG tablet TAKE ONE TABLET BY MOUTH ONCE NIGHTLY 90 tablet 3    cetirizine (zyrTEC) 10 MG tablet Take 1 tablet by mouth Daily.      Dulaglutide (Trulicity) 3 MG/0.5ML solution pen-injector Inject 0.5 mL under the skin into the appropriate area as directed 1 (One) Time Per Week. 6 mL 1    lisinopril-hydrochlorothiazide (PRINZIDE,ZESTORETIC) 10-12.5 MG per tablet Take 1 tablet by mouth Daily. 90 tablet 3    metoprolol succinate XL (TOPROL-XL) 100 MG 24 hr tablet Take 1 tablet by mouth Daily. 90 tablet 3    ondansetron ODT (ZOFRAN-ODT) 4 MG disintegrating tablet DISSOLVE 1 TABLET ON THE TONGUE EVERY 8 HOURS AS NEEDED FOR NAUSEA OR VOMITING. 10 tablet 0    rOPINIRole (REQUIP) 1 MG tablet TAKE ONE TABLET BY MOUTH DAILY 90 tablet 3    pantoprazole (PROTONIX) 40 MG EC tablet Take 1 tablet by mouth Every Morning. 90 tablet 3     No facility-administered  "medications prior to visit.     No opioid medication identified on active medication list. I have reviewed chart for other potential  high risk medication/s and harmful drug interactions in the elderly.      Aspirin is on active medication list. Aspirin use is indicated based on review of current medical condition/s. Pros and cons of this therapy have been discussed today. Benefits of this medication outweigh potential harm.  Patient has been encouraged to continue taking this medication.  .      Patient Active Problem List   Diagnosis    Uterine leiomyoma    Acquired absence of bilateral breasts and nipples    Anxiety    Malignant neoplasm of breast    Abnormal cardiovascular stress test    Coronary artery disease due to lipid rich plaque    Depressive disorder    Dysfunctional uterine bleeding    Electrocardiogram abnormal    Esophageal dysmotility    Hypertension    Estrogen receptor positive tumor status    Primary gout    Menopause    Migraine    Obesity with body mass index 30 or greater    Restless leg syndrome    Sleep pattern disturbance    Diabetes mellitus    Hallux valgus    Other hyperlipidemia    Body mass index (BMI) of 36.0 to 36.9 in adult    Encounter for screening for malignant neoplasm of colon    History of colon polyps    Medicare annual wellness visit, subsequent     Advance Care Planning Advance Directive is not on file.  ACP discussion was held with the patient during this visit. Patient has an advance directive (not in EMR), copy requested.            Objective   Vitals:    10/11/24 1443   BP: 117/62   BP Location: Right arm   Patient Position: Sitting   Pulse: 84   Temp: 98 °F (36.7 °C)   SpO2: 96%   Weight: 114 kg (251 lb)   Height: 175.3 cm (69\")       Estimated body mass index is 37.07 kg/m² as calculated from the following:    Height as of this encounter: 175.3 cm (69\").    Weight as of this encounter: 114 kg (251 lb).    Class 2 Severe Obesity (BMI >=35 and <=39.9). Obesity-related " health conditions include the following: hypertension, coronary heart disease, diabetes mellitus, and dyslipidemias. Obesity is unchanged. BMI is is above average; BMI management plan is completed. We discussed low calorie, low carb based diet program, portion control, and increasing exercise.       Does the patient have evidence of cognitive impairment? No                                                                                                Health  Risk Assessment    Smoking Status:  Social History     Tobacco Use   Smoking Status Never    Passive exposure: Never   Smokeless Tobacco Never     Alcohol Consumption:  Social History     Substance and Sexual Activity   Alcohol Use Yes    Alcohol/week: 1.0 standard drink of alcohol    Types: 1 Glasses of wine per week    Comment: About one time per month       Fall Risk Screen  STEADI Fall Risk Assessment was completed, and patient is at LOW risk for falls.Assessment completed on:10/11/2024    Depression Screening:      10/11/2024     2:48 PM   PHQ-2/PHQ-9 Depression Screening   Little Interest or Pleasure in Doing Things 0-->not at all   Feeling Down, Depressed or Hopeless 0-->not at all   PHQ-9: Brief Depression Severity Measure Score 0     Health Habits and Functional and Cognitive Screening:      10/11/2024     2:40 PM   Functional & Cognitive Status   Do you have difficulty preparing food and eating? No   Do you have difficulty bathing yourself, getting dressed or grooming yourself? No   Do you have difficulty using the toilet? No   Do you have difficulty moving around from place to place? No   Do you have trouble with steps or getting out of a bed or a chair? No   Current Diet Well Balanced Diet   Dental Exam Up to date   Eye Exam Up to date   Exercise (times per week) 2 times per week   Current Exercises Include Walking   Do you need help using the phone?  No   Are you deaf or do you have serious difficulty hearing?  No   Do you need help to go to places  out of walking distance? No   Do you need help shopping? No   Do you need help preparing meals?  No   Do you need help with housework?  No   Do you need help with laundry? No   Do you need help taking your medications? No   Do you need help managing money? No   Do you ever drive or ride in a car without wearing a seat belt? No   Have you felt unusual stress, anger or loneliness in the last month? No   Who do you live with? Spouse   If you need help, do you have trouble finding someone available to you? No   Have you been bothered in the last four weeks by sexual problems? No   Do you have difficulty concentrating, remembering or making decisions? No           Age-appropriate Screening Schedule:  Refer to the list below for future screening recommendations based on patient's age, sex and/or medical conditions. Orders for these recommended tests are listed in the plan section. The patient has been provided with a written plan.    Health Maintenance List  Health Maintenance   Topic Date Due    DIABETIC FOOT EXAM  Never done    HEMOGLOBIN A1C  09/12/2024    DXA SCAN  10/11/2024 (Originally 10/12/2022)    ZOSTER VACCINE (2 of 3) 10/11/2024 (Originally 12/27/2016)    COVID-19 Vaccine (1 - 2023-24 season) 12/31/2024 (Originally 9/1/2024)    INFLUENZA VACCINE  03/31/2025 (Originally 8/1/2024)    Pneumococcal Vaccine 65+ (2 of 2 - PCV) 10/11/2025 (Originally 5/18/2018)    TDAP/TD VACCINES (1 - Tdap) 10/11/2025 (Originally 11/26/1976)    LIPID PANEL  03/12/2025    URINE MICROALBUMIN  03/12/2025    DIABETIC EYE EXAM  07/15/2025    ANNUAL WELLNESS VISIT  10/11/2025    BMI FOLLOWUP  10/11/2025    PAP SMEAR  02/07/2026    COLORECTAL CANCER SCREENING  07/23/2029    HEPATITIS C SCREENING  Completed                                                                                                                                                CMS Preventative Services Quick Reference  Risk Factors Identified During  Encounter  Immunizations Discussed/Encouraged: Tdap, Prevnar 20 (Pneumococcal 20-valent conjugate), Shingrix, COVID19, and RSV (Respiratory Syncytial Virus)    The above risks/problems have been discussed with the patient.  Pertinent information has been shared with the patient in the After Visit Summary.  An After Visit Summary and PPPS were made available to the patient.    Follow Up:   Next Medicare Wellness visit to be scheduled in 1 year.          Additional E&M Note during same encounter follows:  Patient has multiple medical problems which are significant and separately identifiable that require additional work above and beyond the Medicare Wellness Visit.      Chief Complaint  Medicare Wellness-subsequent    Ifeoma Stout is a 68 y.o. female who presents to [facility]     Type 2 diabetes-she does check her blood sugars at home.  Typically her fasting blood sugars are less than 100.    Hypertension-she does check her blood pressure at home.  Her home blood pressures have been equally as great at home as it is here today at 117/62.    Hyperlipidemia-she is taking her atorvastatin on a daily basis.    CAD-she is doing well.  She denies any chest pain tightness or heaviness.  No palpitations tachycardia nor unexplained exertional fatigue or shortness of breath.    Review of Systems   Constitutional:  Negative for fatigue.   HENT:  Positive for congestion, postnasal drip and rhinorrhea.    Eyes:  Negative for visual disturbance.   Respiratory:  Negative for cough, chest tightness, shortness of breath and wheezing.    Cardiovascular:  Negative for chest pain and palpitations.   Endocrine: Negative for polydipsia and polyuria.   Allergic/Immunologic: Positive for environmental allergies.   Neurological:  Positive for headaches.   Psychiatric/Behavioral:  The patient is not nervous/anxious.        Objective   Vital Signs:   Vitals:    10/11/24 1443   BP: 117/62   BP Location: Right  "arm   Patient Position: Sitting   Pulse: 84   Temp: 98 °F (36.7 °C)   SpO2: 96%   Weight: 114 kg (251 lb)   Height: 175.3 cm (69\")       Wt Readings from Last 3 Encounters:   10/11/24 114 kg (251 lb)   07/23/24 115 kg (253 lb 1.4 oz)   03/12/24 116 kg (256 lb 12.8 oz)     BP Readings from Last 3 Encounters:   10/11/24 117/62   07/23/24 110/55   03/12/24 114/66       Physical Exam  Vitals and nursing note reviewed.   Constitutional:       General: She is not in acute distress.     Appearance: Normal appearance. She is obese.   HENT:      Head: Normocephalic.      Right Ear: Tympanic membrane, ear canal and external ear normal.      Left Ear: Tympanic membrane, ear canal and external ear normal.      Nose: Nose normal.      Mouth/Throat:      Mouth: Mucous membranes are moist.      Pharynx: Oropharynx is clear.   Eyes:      General: No scleral icterus.     Conjunctiva/sclera: Conjunctivae normal.      Pupils: Pupils are equal, round, and reactive to light.   Cardiovascular:      Rate and Rhythm: Normal rate and regular rhythm.      Pulses: Normal pulses.      Heart sounds: Normal heart sounds. No murmur heard.  Pulmonary:      Effort: Pulmonary effort is normal.      Breath sounds: Normal breath sounds. No wheezing, rhonchi or rales.   Musculoskeletal:      Cervical back: Neck supple. No rigidity or tenderness.   Lymphadenopathy:      Cervical: No cervical adenopathy.   Skin:     General: Skin is warm and dry.      Coloration: Skin is not jaundiced.      Findings: No rash.   Neurological:      General: No focal deficit present.      Mental Status: She is alert and oriented to person, place, and time.   Psychiatric:         Mood and Affect: Mood normal.         Thought Content: Thought content normal.         Judgment: Judgment normal.         The following data was reviewed by Peter Higgins DO on 10/11/2024  CMP   CMP          3/12/2024    12:05   CMP   Glucose 98    BUN 11    Creatinine 0.78    EGFR 83.9  "   Sodium 140    Potassium 4.4    Chloride 101    Calcium 9.5    Total Protein 7.3    Albumin 4.0    Globulin 3.3    Total Bilirubin 0.3    Alkaline Phosphatase 161    AST (SGOT) 18    ALT (SGPT) 20    Albumin/Globulin Ratio 1.2    BUN/Creatinine Ratio 14.1    Anion Gap 11.4      LIPID   Lipid Panel          3/12/2024    12:05   Lipid Panel   Total Cholesterol 125    Triglycerides 104    HDL Cholesterol 48    VLDL Cholesterol 19    LDL Cholesterol  58    LDL/HDL Ratio 1.17      A1C   Most Recent A1C          3/12/2024    12:05   HGBA1C Most Recent   Hemoglobin A1C 6.80          Assessment & Plan   Diagnoses and all orders for this visit:    1. Other hyperlipidemia (Primary)  Assessment & Plan:   Will update her lipid profile with her routine labs.    Orders:  -     Comprehensive Metabolic Panel  -     Lipid Panel    2. Primary hypertension  Assessment & Plan:  Her blood pressure is great here today as well as at home.  Will continue her current meds and update her labs    Orders:  -     Comprehensive Metabolic Panel  -     Lipid Panel    3. Coronary artery disease due to lipid rich plaque  Assessment & Plan:  She denies any current anginal complaints.  We did discuss switching her Trulicity to Ozempic because of the cardiovascular benefits.  She does not want to do that at this time.      4. Type 2 diabetes mellitus without complication, without long-term current use of insulin  Assessment & Plan:  Her diabetic control sounds excellent.  Will update her diabetic labs.    Orders:  -     Comprehensive Metabolic Panel  -     Lipid Panel  -     Hemoglobin A1c  -     Microalbumin / Creatinine Urine Ratio - Urine, Clean Catch    5. Medicare annual wellness visit, subsequent  Assessment & Plan:  Her Medicare wellness today is otherwise good.  We did discuss vaccines such as her Tdap and pneumococcal influenza shingles and RSV.  She declines those at this time.      Other orders  -     SUMAtriptan (Imitrex) 50 MG tablet;  Take one tablet at onset of headache. May repeat dose one time in 2 hours if headache not relieved.  Dispense: 9 tablet; Refill: 3          Class 2 Severe Obesity (BMI >=35 and <=39.9). Obesity-related health conditions include the following: hypertension, coronary heart disease, diabetes mellitus, and dyslipidemias. Obesity is unchanged. BMI is is above average; BMI management plan is completed. We discussed low calorie, low carb based diet program, portion control, and increasing exercise.       FOLLOW UP  Return in about 6 months (around 4/11/2025) for Recheck.  Patient was given instructions and counseling regarding her condition or for health maintenance advice. Please see specific information pulled into the AVS if appropriate.     Peter Higgins, DO  10/11/24  15:31 EDT

## 2024-10-11 NOTE — ASSESSMENT & PLAN NOTE
Her blood pressure is great here today as well as at home.  Will continue her current meds and update her labs

## 2024-11-26 RX ORDER — ALPRAZOLAM 0.5 MG
TABLET ORAL
Qty: 30 TABLET | Refills: 5 | Status: SHIPPED | OUTPATIENT
Start: 2024-11-26

## 2024-11-26 RX ORDER — PANTOPRAZOLE SODIUM 40 MG/1
40 TABLET, DELAYED RELEASE ORAL EVERY MORNING
Qty: 90 TABLET | Refills: 3 | Status: SHIPPED | OUTPATIENT
Start: 2024-11-26

## 2025-02-03 RX ORDER — DULAGLUTIDE 3 MG/.5ML
INJECTION, SOLUTION SUBCUTANEOUS
Qty: 2 ML | Refills: 5 | Status: SHIPPED | OUTPATIENT
Start: 2025-02-03

## 2025-04-01 RX ORDER — SUMATRIPTAN 50 MG/1
TABLET, FILM COATED ORAL
Qty: 9 TABLET | Refills: 3 | Status: SHIPPED | OUTPATIENT
Start: 2025-04-01

## 2025-04-11 ENCOUNTER — OFFICE VISIT (OUTPATIENT)
Dept: FAMILY MEDICINE CLINIC | Facility: CLINIC | Age: 70
End: 2025-04-11
Payer: MEDICARE

## 2025-04-11 VITALS
DIASTOLIC BLOOD PRESSURE: 76 MMHG | WEIGHT: 248 LBS | HEART RATE: 80 BPM | OXYGEN SATURATION: 98 % | BODY MASS INDEX: 36.73 KG/M2 | HEIGHT: 69 IN | SYSTOLIC BLOOD PRESSURE: 148 MMHG | TEMPERATURE: 98 F

## 2025-04-11 DIAGNOSIS — I10 PRIMARY HYPERTENSION: ICD-10-CM

## 2025-04-11 DIAGNOSIS — Z78.0 POSTMENOPAUSE: ICD-10-CM

## 2025-04-11 DIAGNOSIS — E11.9 TYPE 2 DIABETES MELLITUS WITHOUT COMPLICATION, WITHOUT LONG-TERM CURRENT USE OF INSULIN: ICD-10-CM

## 2025-04-11 DIAGNOSIS — F41.9 ANXIETY: ICD-10-CM

## 2025-04-11 DIAGNOSIS — G25.81 RESTLESS LEG SYNDROME: ICD-10-CM

## 2025-04-11 DIAGNOSIS — E78.49 OTHER HYPERLIPIDEMIA: Primary | ICD-10-CM

## 2025-04-11 DIAGNOSIS — J30.2 SEASONAL ALLERGIES: ICD-10-CM

## 2025-04-11 LAB
ALBUMIN SERPL-MCNC: 4.1 G/DL (ref 3.5–5.2)
ALBUMIN/GLOB SERPL: 1.3 G/DL
ALP SERPL-CCNC: 159 U/L (ref 39–117)
ALT SERPL W P-5'-P-CCNC: 17 U/L (ref 1–33)
ANION GAP SERPL CALCULATED.3IONS-SCNC: 13.7 MMOL/L (ref 5–15)
AST SERPL-CCNC: 18 U/L (ref 1–32)
BILIRUB SERPL-MCNC: 0.4 MG/DL (ref 0–1.2)
BUN SERPL-MCNC: 15 MG/DL (ref 8–23)
BUN/CREAT SERPL: 18.8 (ref 7–25)
CALCIUM SPEC-SCNC: 9.6 MG/DL (ref 8.6–10.5)
CHLORIDE SERPL-SCNC: 100 MMOL/L (ref 98–107)
CHOLEST SERPL-MCNC: 123 MG/DL (ref 0–200)
CO2 SERPL-SCNC: 27.3 MMOL/L (ref 22–29)
CREAT SERPL-MCNC: 0.8 MG/DL (ref 0.57–1)
EGFRCR SERPLBLD CKD-EPI 2021: 80.9 ML/MIN/1.73
GLOBULIN UR ELPH-MCNC: 3.2 GM/DL
GLUCOSE SERPL-MCNC: 104 MG/DL (ref 65–99)
HBA1C MFR BLD: 6.7 % (ref 4.8–5.6)
HDLC SERPL-MCNC: 50 MG/DL (ref 40–60)
LDLC SERPL CALC-MCNC: 58 MG/DL (ref 0–100)
LDLC/HDLC SERPL: 1.17 {RATIO}
POTASSIUM SERPL-SCNC: 4 MMOL/L (ref 3.5–5.2)
PROT SERPL-MCNC: 7.3 G/DL (ref 6–8.5)
SODIUM SERPL-SCNC: 141 MMOL/L (ref 136–145)
TRIGL SERPL-MCNC: 73 MG/DL (ref 0–150)
VLDLC SERPL-MCNC: 15 MG/DL (ref 5–40)

## 2025-04-11 PROCEDURE — 83036 HEMOGLOBIN GLYCOSYLATED A1C: CPT | Performed by: FAMILY MEDICINE

## 2025-04-11 PROCEDURE — 80061 LIPID PANEL: CPT | Performed by: FAMILY MEDICINE

## 2025-04-11 PROCEDURE — 80053 COMPREHEN METABOLIC PANEL: CPT | Performed by: FAMILY MEDICINE

## 2025-04-11 RX ORDER — MONTELUKAST SODIUM 10 MG/1
10 TABLET ORAL NIGHTLY
Qty: 90 TABLET | Refills: 1 | Status: SHIPPED | OUTPATIENT
Start: 2025-04-11

## 2025-04-11 NOTE — ASSESSMENT & PLAN NOTE
Her blood pressure typically at home is very good.  Will not make any changes in her meds and update her labs

## 2025-04-11 NOTE — ASSESSMENT & PLAN NOTE
She states her seasonal allergies are terrible at this time.  She has been using her Zyrtec every day without adequate relief.  Historically she is use steroid nasal sprays but they seem to cause nosebleeds.  Will try adding some Singulair to her regiment.

## 2025-04-11 NOTE — ASSESSMENT & PLAN NOTE
She states her restless leg syndrome is not every night.  She states the medicine makes her more anxious and nauseated.  She will work on other modalities to see if she can help prevent her restless leg syndromes and not have to use the ropinirole at all.  If it still occurs we could always try some gabapentin in its place

## 2025-04-11 NOTE — PROGRESS NOTES
Chief Complaint   Patient presents with    Follow-up     6 month     Hyperlipidemia    Hypertension        Subjective     Ifeoma Stout  has a past medical history of Diabetes mellitus (07/20/2020), GERD (gastroesophageal reflux disease) (2017), History of chemotherapy, History of staph infection, HL (hearing loss) (2020), Hypertension, Migraine, Post-menopausal bleeding, and Uterine fibroid.    Type 2 diabetes-she does check her blood sugars on a regular basis.  She states they are always in range.  They range anywhere from .  She denies any excessive thirst or excessive urination.  She has noticed some blurred vision which is not new.    Hypertension-she is doing well.  She states her blood pressure readings at home are typically good.  She takes her lisinopril hydrochlorothiazide and metoprolol daily.    Hyperlipidemia-she takes her atorvastatin on a daily basis.    Anxiety disorder-she states her anxiety is improved.  She has been going to counseling and has done so well her counselor did not feel she needed to come any longer.  She has the alprazolam that she rarely uses.  Maybe only about twice per month.        PHQ-2 Depression Screening  Little interest or pleasure in doing things?     Feeling down, depressed, or hopeless?     PHQ-2 Total Score     PHQ-9 Depression Screening  Little interest or pleasure in doing things?     Feeling down, depressed, or hopeless?     Trouble falling or staying asleep, or sleeping too much?     Feeling tired or having little energy?     Poor appetite or overeating?     Feeling bad about yourself - or that you are a failure or have let yourself or your family down?     Trouble concentrating on things, such as reading the newspaper or watching television?     Moving or speaking so slowly that other people could have noticed? Or the opposite - being so fidgety or restless that you have been moving around a lot more than usual?     Thoughts that you would be better off dead,  or of hurting yourself in some way?     PHQ-9 Total Score     If you checked off any problems, how difficult have these problems made it for you to do your work, take care of things at home, or get along with other people?       Allergies   Allergen Reactions    Dapagliflozin Rash     Other reaction(s): Rash      Adhesive Tape Rash       Prior to Admission medications    Medication Sig Start Date End Date Taking? Authorizing Provider   allopurinol (ZYLOPRIM) 300 MG tablet TAKE 1 TABLET BY MOUTH DAILY 9/10/24  Yes Peter Higgins DO   ALPRAZolam (XANAX) 0.5 MG tablet TAKE ONE TABLET BY MOUTH TWICE A DAY AS NEEDED FOR ANXIETY 11/26/24  Yes Peter Higgins DO   aspirin 81 MG chewable tablet Chew 1 tablet Daily.   Yes Jeimy Mathis MD   atorvastatin (LIPITOR) 40 MG tablet TAKE ONE TABLET BY MOUTH ONCE NIGHTLY 7/25/24  Yes Peter Higgins DO   cetirizine (zyrTEC) 10 MG tablet Take 1 tablet by mouth Daily.   Yes Jeimy Mathis MD   Dulaglutide (Trulicity) 3 MG/0.5ML solution auto-injector INJECT 3 MG UNDER THE SKIN ONCE WEEKLY 2/3/25  Yes Peter Higgins DO   lisinopril-hydrochlorothiazide (PRINZIDE,ZESTORETIC) 10-12.5 MG per tablet Take 1 tablet by mouth Daily. 2/1/24  Yes Peter Higgins DO   metoprolol succinate XL (TOPROL-XL) 100 MG 24 hr tablet Take 1 tablet by mouth Daily. 2/1/24  Yes Peter Higgins DO   ondansetron ODT (ZOFRAN-ODT) 4 MG disintegrating tablet DISSOLVE 1 TABLET ON THE TONGUE EVERY 8 HOURS AS NEEDED FOR NAUSEA OR VOMITING. 10/12/22  Yes Peter Higgins DO   pantoprazole (PROTONIX) 40 MG EC tablet TAKE ONE TABLET BY MOUTH EVERY MORNING 11/26/24  Yes Peter Higgins DO   rOPINIRole (REQUIP) 1 MG tablet TAKE ONE TABLET BY MOUTH DAILY 6/25/24  Yes Peter Higgins DO   SUMAtriptan (IMITREX) 50 MG tablet TAKE 1 TABLET BY MOUTH AT ONSET OF MIGRAINE; MAY REPEAT 1 TABLET IN 2 HOURS IF HEADACHE NOT RELIEVED 4/1/25   Yes Peter Higgins DO        Patient Active Problem List   Diagnosis    Uterine leiomyoma    Acquired absence of bilateral breasts and nipples    Anxiety    Malignant neoplasm of breast    Abnormal cardiovascular stress test    Coronary artery disease due to lipid rich plaque    Depressive disorder    Dysfunctional uterine bleeding    Electrocardiogram abnormal    Esophageal dysmotility    Hypertension    Estrogen receptor positive tumor status    Primary gout    Menopause    Migraine    Obesity with body mass index 30 or greater    Restless leg syndrome    Sleep pattern disturbance    Diabetes mellitus    Hallux valgus    Other hyperlipidemia    Body mass index (BMI) of 36.0 to 36.9 in adult    Encounter for screening for malignant neoplasm of colon    History of colon polyps    Medicare annual wellness visit, subsequent    Seasonal allergies        Past Surgical History:   Procedure Laterality Date    BREAST SURGERY  2006    Double Mastectomy    COLONOSCOPY N/A 7/23/2024    Procedure: COLONOSCOPY W/ COLD SNARE POLYPECTOMY;  Surgeon: Kuldip Tanner MD;  Location: [facility] END[facility];  Service: Gastroenterology;  Laterality: N/A;  COLON POYLPS,DIVERTICULOSIS    COSMETIC SURGERY  2007    Breast reconstruction    DILATATION AND CURETTAGE      X2    DILATATION AND CURETTAGE N/A 01/29/2018    Procedure: DILATATION AND CURETTAGE;  Surgeon: Moriah Schmitz MD;  Location: [facility];  Service:     ENDOMETRIAL ABLATION      MASTECTOMY      WITH BREAST RECONSTRUCITON    MASTECTOMY Bilateral     TOTAL LAPAROSCOPIC HYSTERECTOMY Bilateral 03/12/2018    Procedure: TOTAL HYSTERECTOMY BILATERAL SALPINGECTOMY BILATERAL SALPINGO-OOPHORECTOMY DA CHERY ROBOT;  Surgeon: Karley Banda MD;  Location: [facility];  Service: DaVinci       Social History     Socioeconomic History    Marital status:    Tobacco Use    Smoking status: Never     Passive exposure: Never    Smokeless tobacco: Never   Vaping Use     "Vaping status: Never Used   Substance and Sexual Activity    Alcohol use: Yes     Alcohol/week: 1.0 standard drink of alcohol     Types: 1 Glasses of wine per week     Comment: About one time per month    Drug use: No    Sexual activity: Defer       Family History   Problem Relation Age of Onset    Colon cancer Sister     Cancer Sister         Colon/Liver    Diabetes Sister     Heart disease Father     Malig Hyperthermia Neg Hx        Family history, surgical history, past medical history, Allergies and meds reviewed with patient today and updated in [facility] EMR.     ROS:  Review of Systems   Constitutional:  Negative for fatigue.   HENT:  Positive for congestion, postnasal drip and rhinorrhea.    Eyes:  Positive for blurred vision. Negative for visual disturbance.   Respiratory:  Negative for cough, chest tightness, shortness of breath and wheezing.    Cardiovascular:  Negative for chest pain and palpitations.   Endocrine: Negative for polydipsia and polyuria.   Allergic/Immunologic: Positive for environmental allergies.   Neurological:  Negative for headache.   Psychiatric/Behavioral:  Negative for depressed mood. The patient is not nervous/anxious.        OBJECTIVE:  Vitals:    04/11/25 1405   BP: 148/76   BP Location: Left arm   Patient Position: Sitting   Pulse: 80   Temp: 98 °F (36.7 °C)   SpO2: 98%   Weight: 112 kg (248 lb)   Height: 175.3 cm (69\")     No results found.   Body mass index is 36.62 kg/m².  No LMP recorded. Patient has had a hysterectomy.    The ASCVD Risk score (Ayse DK, et al., 2019) failed to calculate for the following reasons:    The valid total cholesterol range is 130 to 320 mg/dL     Physical Exam  Vitals and nursing note reviewed.   Constitutional:       General: She is not in acute distress.     Appearance: Normal appearance. She is obese.   HENT:      Head: Normocephalic.      Right Ear: Tympanic membrane, ear canal and external ear normal.      Left Ear: Tympanic membrane, ear canal " and external ear normal.      Nose: Nose normal.      Mouth/Throat:      Mouth: Mucous membranes are moist.      Pharynx: Oropharynx is clear.   Eyes:      General: No scleral icterus.     Conjunctiva/sclera: Conjunctivae normal.      Pupils: Pupils are equal, round, and reactive to light.   Cardiovascular:      Rate and Rhythm: Normal rate and regular rhythm.      Pulses: Normal pulses.      Heart sounds: Normal heart sounds. No murmur heard.  Pulmonary:      Effort: Pulmonary effort is normal.      Breath sounds: Normal breath sounds. No wheezing, rhonchi or rales.   Musculoskeletal:      Cervical back: Neck supple. No rigidity or tenderness.   Lymphadenopathy:      Cervical: No cervical adenopathy.   Skin:     General: Skin is warm and dry.      Coloration: Skin is not jaundiced.      Findings: No rash.   Neurological:      General: No focal deficit present.      Mental Status: She is alert and oriented to person, place, and time.   Psychiatric:         Mood and Affect: Mood normal.         Thought Content: Thought content normal.         Judgment: Judgment normal.         Procedures    No visits with results within 30 Day(s) from this visit.   Latest known visit with results is:   Office Visit on 10/11/2024   Component Date Value Ref Range Status    Glucose 10/11/2024 111 (H)  65 - 99 mg/dL Final    BUN 10/11/2024 13  8 - 23 mg/dL Final    Creatinine 10/11/2024 0.80  0.57 - 1.00 mg/dL Final    Sodium 10/11/2024 140  136 - 145 mmol/L Final    Potassium 10/11/2024 3.8  3.5 - 5.2 mmol/L Final    Chloride 10/11/2024 99  98 - 107 mmol/L Final    CO2 10/11/2024 29.0  22.0 - 29.0 mmol/L Final    Calcium 10/11/2024 9.7  8.6 - 10.5 mg/dL Final    Total Protein 10/11/2024 7.3  6.0 - 8.5 g/dL Final    Albumin 10/11/2024 4.1  3.5 - 5.2 g/dL Final    ALT (SGPT) 10/11/2024 20  1 - 33 U/L Final    AST (SGOT) 10/11/2024 17  1 - 32 U/L Final    Alkaline Phosphatase 10/11/2024 168 (H)  39 - 117 U/L Final    Total Bilirubin  10/11/2024 0.4  0.0 - 1.2 mg/dL Final    Globulin 10/11/2024 3.2  gm/dL Final    A/G Ratio 10/11/2024 1.3  g/dL Final    BUN/Creatinine Ratio 10/11/2024 16.3  7.0 - 25.0 Final    Anion Gap 10/11/2024 12.0  5.0 - 15.0 mmol/L Final    eGFR 10/11/2024 81.4  >60.0 mL/min/1.73 Final    Total Cholesterol 10/11/2024 119  0 - 200 mg/dL Final    Triglycerides 10/11/2024 103  0 - 150 mg/dL Final    HDL Cholesterol 10/11/2024 49  40 - 60 mg/dL Final    LDL Cholesterol  10/11/2024 51  0 - 100 mg/dL Final    VLDL Cholesterol 10/11/2024 19  5 - 40 mg/dL Final    LDL/HDL Ratio 10/11/2024 1.01   Final    Hemoglobin A1C 10/11/2024 6.40 (H)  4.80 - 5.60 % Final    Microalbumin/Creatinine Ratio 10/11/2024 7.1  0.0 - 29.0 mg/g Final    Creatinine, Urine 10/11/2024 379.5  mg/dL Final    Microalbumin, Urine 10/11/2024 2.7  mg/dL Final       ASSESSMENT/ PLAN:    Diagnoses and all orders for this visit:    1. Other hyperlipidemia (Primary)  Assessment & Plan:   Will update her lipid profile with her routine labs here today.      2. Postmenopause  -     DEXA Bone Density Axial; Future    3. Primary hypertension  Assessment & Plan:  Her blood pressure typically at home is very good.  Will not make any changes in her meds and update her labs      4. Type 2 diabetes mellitus without complication, without long-term current use of insulin  Assessment & Plan:  Will update her A1c with her routine labs.  She will continue to work on lifestyle changes of diet exercise and weight loss.    Orders:  -     Comprehensive Metabolic Panel  -     Lipid Panel  -     Hemoglobin A1c    5. Anxiety  Assessment & Plan:  She feels that her anxiety overall was much improved.  Her use of the alprazolam is infrequent.      6. Restless leg syndrome  Assessment & Plan:  She states her restless leg syndrome is not every night.  She states the medicine makes her more anxious and nauseated.  She will work on other modalities to see if she can help prevent her restless  leg syndromes and not have to use the ropinirole at all.  If it still occurs we could always try some gabapentin in its place      7. Seasonal allergies  Assessment & Plan:  She states her seasonal allergies are terrible at this time.  She has been using her Zyrtec every day without adequate relief.  Historically she is use steroid nasal sprays but they seem to cause nosebleeds.  Will try adding some Singulair to her regiment.      Other orders  -     montelukast (Singulair) 10 MG tablet; Take 1 tablet by mouth Every Night.  Dispense: 90 tablet; Refill: 1               Orders Placed Today:     New Medications Ordered This Visit   Medications    montelukast (Singulair) 10 MG tablet     Sig: Take 1 tablet by mouth Every Night.     Dispense:  90 tablet     Refill:  1        Management Plan:     An After Visit Summary was printed and given to the patient at discharge.    Follow-up: Return in about 6 months (around 10/11/2025) for Recheck.    Peter Higgins DO 4/11/2025 14:41 EDT  This note was electronically signed.

## 2025-04-11 NOTE — ASSESSMENT & PLAN NOTE
Will update her A1c with her routine labs.  She will continue to work on lifestyle changes of diet exercise and weight loss.

## 2025-05-22 ENCOUNTER — HOSPITAL ENCOUNTER (OUTPATIENT)
Dept: BONE DENSITY | Facility: HOSPITAL | Age: 70
Discharge: HOME | End: 2025-05-22
Admitting: FAMILY MEDICINE
Payer: MEDICARE

## 2025-05-22 DIAGNOSIS — Z78.0 POSTMENOPAUSE: ICD-10-CM

## 2025-05-22 PROCEDURE — 77080 DXA BONE DENSITY AXIAL: CPT

## 2025-05-22 RX ORDER — ONDANSETRON 4 MG/1
4 TABLET, ORALLY DISINTEGRATING ORAL EVERY 8 HOURS PRN
Qty: 10 TABLET | Refills: 1 | Status: SHIPPED | OUTPATIENT
Start: 2025-05-22

## 2025-05-22 NOTE — TELEPHONE ENCOUNTER
Caller: Ifeoma Stout    Relationship: Self    Best call back number: [phone]     Requested Prescriptions:   Requested Prescriptions     Pending Prescriptions Disp Refills    ondansetron ODT (ZOFRAN-ODT) 4 MG disintegrating tablet 10 tablet 0        Pharmacy where request should be sent: [facility] [id number]  [facility] [place] [facility] [address] [facility] [address] - [phone] [facility] [phone] FX     Last office visit with prescribing clinician: 4/11/2025   Last telemedicine visit with prescribing clinician: Visit date not found   Next office visit with prescribing clinician: 10/13/2025     Does the patient have less than a 3 day supply:  [x] Yes  [] No    Would you like a call back once the refill request has been completed: [] Yes [] No    If the office needs to give you a call back, can they leave a voicemail: [] Yes [] No    Jacob Castro Rep   05/22/25 12:46 EDT

## 2025-06-25 RX ORDER — ROPINIROLE 1 MG/1
1 TABLET, FILM COATED ORAL DAILY
Qty: 90 TABLET | Refills: 3 | Status: SHIPPED | OUTPATIENT
Start: 2025-06-25

## 2025-07-25 RX ORDER — DULAGLUTIDE 3 MG/.5ML
INJECTION, SOLUTION SUBCUTANEOUS
Qty: 2 ML | Refills: 5 | Status: SHIPPED | OUTPATIENT
Start: 2025-07-25

## 2025-07-29 RX ORDER — ATORVASTATIN CALCIUM 40 MG/1
40 TABLET, FILM COATED ORAL NIGHTLY
Qty: 90 TABLET | Refills: 3 | Status: SHIPPED | OUTPATIENT
Start: 2025-07-29

## 2025-07-29 RX ORDER — ALPRAZOLAM 0.5 MG
0.5 TABLET ORAL 2 TIMES DAILY PRN
Qty: 30 TABLET | Refills: 1 | Status: SHIPPED | OUTPATIENT
Start: 2025-07-29

## 2025-08-14 DIAGNOSIS — G56.00 CARPAL TUNNEL SYNDROME, UNSPECIFIED LATERALITY: Primary | ICD-10-CM

## 2025-08-21 ENCOUNTER — EXTERNAL PBMM DATA (OUTPATIENT)
Dept: PHARMACY | Facility: OTHER | Age: 70
End: 2025-08-21
Payer: MEDICARE

## 2025-08-25 RX ORDER — ONDANSETRON 4 MG/1
TABLET, ORALLY DISINTEGRATING ORAL
Qty: 10 TABLET | Refills: 1 | Status: SHIPPED | OUTPATIENT
Start: 2025-08-25

## (undated) DEVICE — DRAPE,REIN 53X77,STERILE: Brand: MEDLINE

## (undated) DEVICE — TIP COVER ACCESSORY

## (undated) DEVICE — PAD SANI MAXI W/ADHS SNG WRP 11IN

## (undated) DEVICE — GLV SURG BIOGEL LTX PF 7

## (undated) DEVICE — KIT INCLUDES: GTB14, ALEXIS CONTAINED EXT SYS 5BXCNGL2, GELPOINT ADVANCED ACCESS PLATFORM: Brand: ALEXIS CONTAINED EXTRACTION SYSTEM WITH GELPOINT ADVANCED ACCESS PLATFORM

## (undated) DEVICE — ADHS SKIN DERMABOND TOP ADVANCED

## (undated) DEVICE — DRAPE,UNDERBUTTOCKS,PCH,STERILE: Brand: MEDLINE

## (undated) DEVICE — PREMIUM WET SKIN PREP TRAY: Brand: MEDLINE INDUSTRIES, INC.

## (undated) DEVICE — BNDG ADHS CURAD FLX/FABRC 2X4IN STRL LF

## (undated) DEVICE — BNDG ADHS CURAD FLX/FABRC FNGRTP LG STRL LF

## (undated) DEVICE — Device

## (undated) DEVICE — SOL NACL 0.9PCT 1000ML

## (undated) DEVICE — GLV SURG BIOGEL LTX PF 6 1/2

## (undated) DEVICE — Device: Brand: DEFENDO AIR/WATER/SUCTION AND BIOPSY VALVE

## (undated) DEVICE — SOL IRRG H2O PL/BG 1000ML STRL

## (undated) DEVICE — SUT VIC 0 TN 27IN DYED JTN0G

## (undated) DEVICE — DISPOSABLE MONOPOLAR ENDOSCOPIC CORD 10 FT. (3M): Brand: KIRWAN

## (undated) DEVICE — MANIP UTER RUMI TP 6.7MM 10CM GRN

## (undated) DEVICE — OBT BLADLES ENDOWRIST DAVINCI/S 8MM

## (undated) DEVICE — SOL ANTISTICK CAUTRY ELECTROLUBE LF

## (undated) DEVICE — ENDOPATH XCEL BLADELESS TROCARS WITH STABILITY SLEEVES: Brand: ENDOPATH XCEL

## (undated) DEVICE — STRAP STIRUP SLP RNG 19X3.5IN DISP

## (undated) DEVICE — SNAR E/S POLYP SNAREMASTER OVL/10MM 2.8X2300MM YEL

## (undated) DEVICE — CATH FOL SIMPLYLATEX SILELAST 16F 17IN

## (undated) DEVICE — MANIP UTER RUMI 2 KOH EFFICIENT 3CM BL

## (undated) DEVICE — ENDOPATH PNEUMONEEDLE INSUFFLATION NEEDLES WITH LUER LOCK CONNECTORS 120MM: Brand: ENDOPATH

## (undated) DEVICE — CONMED DISPOSABLE BIPOLAR CABLE, 10' (3.05M): Brand: CONMED

## (undated) DEVICE — LOU LITHOTOMY ROBOTIC: Brand: MEDLINE INDUSTRIES, INC.

## (undated) DEVICE — SYR LUERLOK 50ML

## (undated) DEVICE — THE SINGLE USE ETRAP – POLYP TRAP IS USED FOR SUCTION RETRIEVAL OF ENDOSCOPICALLY REMOVED POLYPS.: Brand: ETRAP

## (undated) DEVICE — ENDOPATH XCEL UNIVERSAL TROCAR STABLILITY SLEEVES: Brand: ENDOPATH XCEL

## (undated) DEVICE — SOLIDIFIER LIQLOC PLS 1500CC BT

## (undated) DEVICE — AMD ANTIMICROBIAL GAUZE SPONGES,12 PLY USP TYPE VII, 0.2% POLYHEXAMETHYLENE BIGUANIDE HCI (PHMB): Brand: CURITY

## (undated) DEVICE — LOU D & C HYSTEROSCOPY: Brand: MEDLINE INDUSTRIES, INC.

## (undated) DEVICE — UNDYED BRAIDED (POLYGLACTIN 910), SYNTHETIC ABSORBABLE SUTURE: Brand: COATED VICRYL

## (undated) DEVICE — SUT MNCRYL PLS ANTIB UD 4/0 PS2 18IN